# Patient Record
Sex: FEMALE | Race: WHITE | NOT HISPANIC OR LATINO | Employment: UNEMPLOYED | URBAN - METROPOLITAN AREA
[De-identification: names, ages, dates, MRNs, and addresses within clinical notes are randomized per-mention and may not be internally consistent; named-entity substitution may affect disease eponyms.]

---

## 2018-12-05 ENCOUNTER — HOSPITAL ENCOUNTER (EMERGENCY)
Facility: HOSPITAL | Age: 38
Discharge: HOME | End: 2018-12-05
Attending: EMERGENCY MEDICINE | Admitting: EMERGENCY MEDICINE

## 2018-12-05 VITALS
SYSTOLIC BLOOD PRESSURE: 131 MMHG | WEIGHT: 169.75 LBS | OXYGEN SATURATION: 99 % | TEMPERATURE: 98.1 F | DIASTOLIC BLOOD PRESSURE: 80 MMHG | RESPIRATION RATE: 18 BRPM | HEART RATE: 75 BPM

## 2018-12-05 DIAGNOSIS — M54.9 BACK PAIN: ICD-10-CM
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
## 2018-12-05 PROCEDURE — 99284 EMERGENCY DEPT VISIT MOD MDM: CPT

## 2018-12-05 RX ORDER — IBUPROFEN 400 MG/1
400 TABLET ORAL ONCE
Status: COMPLETED | OUTPATIENT
Start: 2018-12-05 | End: 2018-12-05

## 2018-12-05 RX ORDER — ALBUTEROL SULFATE 90 UG/1
2 AEROSOL, METERED RESPIRATORY (INHALATION) EVERY 6 HOURS PRN
COMMUNITY

## 2018-12-05 RX ORDER — METHOCARBAMOL 500 MG/1
500 TABLET, FILM COATED ORAL ONCE
Status: COMPLETED | OUTPATIENT
Start: 2018-12-05 | End: 2018-12-05

## 2018-12-05 RX ORDER — METHOCARBAMOL 500 MG/1
500 TABLET, FILM COATED ORAL 2 TIMES DAILY
Qty: 20 TABLET | Refills: 0 | Status: SHIPPED | OUTPATIENT
Start: 2018-12-05 | End: 2021-03-11

## 2018-12-05 RX ADMIN — METHOCARBAMOL TABLETS 500 MG: 500 TABLET, COATED ORAL at 22:27

## 2018-12-05 RX ADMIN — IBUPROFEN 400 MG: 400 TABLET ORAL at 22:27
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
## 2020-07-24 DIAGNOSIS — Z20.828 EXPOSURE TO SARS VIRUS: Primary | ICD-10-CM

## 2020-07-24 DIAGNOSIS — Z20.828 EXPOSURE TO SARS VIRUS: ICD-10-CM

## 2020-07-24 PROCEDURE — U0003 INFECTIOUS AGENT DETECTION BY NUCLEIC ACID (DNA OR RNA); SEVERE ACUTE RESPIRATORY SYNDROME CORONAVIRUS 2 (SARS-COV-2) (CORONAVIRUS DISEASE [COVID-19]), AMPLIFIED PROBE TECHNIQUE, MAKING USE OF HIGH THROUGHPUT TECHNOLOGIES AS DESCRIBED BY CMS-2020-01-R: HCPCS

## 2020-07-26 LAB — SARS-COV-2 RNA SPEC QL NAA+PROBE: NOT DETECTED

## 2020-07-27 ENCOUNTER — TELEPHONE (OUTPATIENT)
Dept: FAMILY MEDICINE CLINIC | Facility: CLINIC | Age: 40
End: 2020-07-27

## 2020-09-16 ENCOUNTER — TELEPHONE (OUTPATIENT)
Dept: OBGYN CLINIC | Facility: CLINIC | Age: 40
End: 2020-09-16

## 2020-09-16 NOTE — TELEPHONE ENCOUNTER
patient called and left a vm would like appointment with Dr Jazzy Butt, called her back, and was disconnected, rtc to both number and left vm to rtc to schedule an appointment

## 2020-09-18 ENCOUNTER — OFFICE VISIT (OUTPATIENT)
Dept: OBGYN CLINIC | Facility: CLINIC | Age: 40
End: 2020-09-18

## 2020-09-18 VITALS
HEIGHT: 63 IN | TEMPERATURE: 98.1 F | WEIGHT: 185 LBS | SYSTOLIC BLOOD PRESSURE: 104 MMHG | DIASTOLIC BLOOD PRESSURE: 60 MMHG | BODY MASS INDEX: 32.78 KG/M2

## 2020-09-18 DIAGNOSIS — B96.89 BV (BACTERIAL VAGINOSIS): Primary | ICD-10-CM

## 2020-09-18 DIAGNOSIS — N76.0 BV (BACTERIAL VAGINOSIS): Primary | ICD-10-CM

## 2020-09-18 LAB
BV WHIFF TEST VAG QL: POSITIVE
CLUE CELLS SPEC QL WET PREP: POSITIVE
SL AMB POCT WET MOUNT: ABNORMAL
T VAGINALIS VAG QL WET PREP: NEGATIVE
YEAST VAG QL WET PREP: NEGATIVE

## 2020-09-18 PROCEDURE — 87210 SMEAR WET MOUNT SALINE/INK: CPT | Performed by: OBSTETRICS & GYNECOLOGY

## 2020-09-18 PROCEDURE — 99212 OFFICE O/P EST SF 10 MIN: CPT | Performed by: OBSTETRICS & GYNECOLOGY

## 2020-09-18 RX ORDER — METRONIDAZOLE 500 MG/1
500 TABLET ORAL EVERY 12 HOURS SCHEDULED
Qty: 14 TABLET | Refills: 0 | Status: SHIPPED | OUTPATIENT
Start: 2020-09-18 | End: 2020-09-25

## 2020-09-18 NOTE — PROGRESS NOTES
Assessment/Plan:  19-year-old female presents complaining of 3 day history of vaginal discharge that odor  Exam consistent with bacterial vaginosis, confirmed on wet mount  Treat with Flagyl 500 mg twice daily for 7 days  Patient is in a 3 year monogamous relationship  Follow-up for annual visit or if not improving  Diagnoses and all orders for this visit:    BV (bacterial vaginosis)  -     POCT wet mount  -     metroNIDAZOLE (FLAGYL) 500 mg tablet; Take 1 tablet (500 mg total) by mouth every 12 (twelve) hours for 7 days          Subjective:      Patient ID: Yonny Knox is a 35 y o  female  19-year-old  4 para 1 female presents complaining of a 3 day history of vaginal discharge with an odor     Patient has longstanding infertility, had 1 tube removed through an ectopic pregnancy and was tested and her remaining tube is blocked  She denies any itching  She denies any dyspareunia  She denies any dysuria or urinary frequency  She has no fever or chills  The following portions of the patient's history were reviewed and updated as appropriate: allergies, current medications, past family history, past medical history, past social history, past surgical history and problem list     Review of Systems   Gastrointestinal: Negative for abdominal distention and abdominal pain  Genitourinary: Positive for vaginal discharge  Negative for decreased urine volume, difficulty urinating, dyspareunia, dysuria, flank pain, frequency, genital sores, hematuria, menstrual problem, pelvic pain, urgency, vaginal bleeding and vaginal pain  Vulvar and vaginal erythema  Increased vaginal discharge noted         Objective:      /60 (BP Location: Right arm, Patient Position: Sitting, Cuff Size: Adult)   Temp 98 1 °F (36 7 °C) (Tympanic)   Ht 5' 3" (1 6 m)   Wt 83 9 kg (185 lb)   LMP 2020   BMI 32 77 kg/m²          Physical Exam  Exam conducted with a chaperone present     Constitutional: Appearance: She is well-developed  She is obese  Comments: Class 1 obesity, BMI is 32 7   Abdominal:      General: There is no distension  Palpations: Abdomen is soft  There is no mass  Tenderness: There is no abdominal tenderness  Genitourinary:     General: Normal vulva  Exam position: Lithotomy position  Labia:         Right: No rash or tenderness  Left: No rash or tenderness  Vagina: Vaginal discharge and erythema present  No tenderness or bleeding  Cervix: Normal       Uterus: Normal        Adnexa: Right adnexa normal and left adnexa normal         Right: No mass, tenderness or fullness  Left: No mass, tenderness or fullness  Rectum: Normal    Lymphadenopathy:      Lower Body: No right inguinal adenopathy  No left inguinal adenopathy  Neurological:      Mental Status: She is alert

## 2020-12-18 ENCOUNTER — HOSPITAL ENCOUNTER (EMERGENCY)
Facility: HOSPITAL | Age: 40
Discharge: HOME | End: 2020-12-18
Attending: EMERGENCY MEDICINE | Admitting: EMERGENCY MEDICINE
Payer: COMMERCIAL

## 2020-12-18 ENCOUNTER — APPOINTMENT (EMERGENCY)
Dept: RADIOLOGY | Facility: HOSPITAL | Age: 40
End: 2020-12-18
Payer: COMMERCIAL

## 2020-12-18 VITALS
WEIGHT: 175 LBS | BODY MASS INDEX: 31.01 KG/M2 | HEIGHT: 63 IN | TEMPERATURE: 99.5 F | SYSTOLIC BLOOD PRESSURE: 123 MMHG | OXYGEN SATURATION: 99 % | DIASTOLIC BLOOD PRESSURE: 67 MMHG | RESPIRATION RATE: 18 BRPM | HEART RATE: 103 BPM

## 2020-12-18 DIAGNOSIS — T78.40XA ACUTE ALLERGIC REACTION, INITIAL ENCOUNTER: Primary | ICD-10-CM

## 2020-12-18 PROCEDURE — 96374 THER/PROPH/DIAG INJ IV PUSH: CPT

## 2020-12-18 PROCEDURE — 99284 EMERGENCY DEPT VISIT MOD MDM: CPT

## 2020-12-18 PROCEDURE — 96375 TX/PRO/DX INJ NEW DRUG ADDON: CPT

## 2020-12-18 PROCEDURE — 71045 X-RAY EXAM CHEST 1 VIEW: CPT

## 2020-12-18 PROCEDURE — 93005 ELECTROCARDIOGRAM TRACING: CPT

## 2020-12-18 PROCEDURE — 96372 THER/PROPH/DIAG INJ SC/IM: CPT

## 2020-12-18 PROCEDURE — 99284 EMERGENCY DEPT VISIT MOD MDM: CPT | Performed by: PHYSICIAN ASSISTANT

## 2020-12-18 RX ORDER — EPINEPHRINE 1 MG/ML
0.3 INJECTION, SOLUTION, CONCENTRATE INTRAVENOUS ONCE
Status: COMPLETED | OUTPATIENT
Start: 2020-12-18 | End: 2020-12-18

## 2020-12-18 RX ORDER — DIPHENHYDRAMINE HYDROCHLORIDE 50 MG/ML
50 INJECTION INTRAMUSCULAR; INTRAVENOUS ONCE
Status: COMPLETED | OUTPATIENT
Start: 2020-12-18 | End: 2020-12-18

## 2020-12-18 RX ORDER — PREDNISONE 20 MG/1
TABLET ORAL
Qty: 16 TABLET | Refills: 0 | Status: SHIPPED | OUTPATIENT
Start: 2020-12-18 | End: 2020-12-28

## 2020-12-18 RX ORDER — METHYLPREDNISOLONE SODIUM SUCCINATE 125 MG/2ML
125 INJECTION, POWDER, LYOPHILIZED, FOR SOLUTION INTRAMUSCULAR; INTRAVENOUS ONCE
Status: COMPLETED | OUTPATIENT
Start: 2020-12-18 | End: 2020-12-18

## 2020-12-18 RX ADMIN — DIPHENHYDRAMINE HYDROCHLORIDE 50 MG: 50 INJECTION, SOLUTION INTRAMUSCULAR; INTRAVENOUS at 12:09

## 2020-12-18 RX ADMIN — METHYLPREDNISOLONE SODIUM SUCCINATE 125 MG: 125 INJECTION, POWDER, FOR SOLUTION INTRAMUSCULAR; INTRAVENOUS at 12:06

## 2020-12-18 RX ADMIN — EPINEPHRINE 0.3 MG: 1 INJECTION, SOLUTION, CONCENTRATE INTRAVENOUS at 12:10

## 2020-12-18 RX ADMIN — FAMOTIDINE 20 MG: 10 INJECTION, SOLUTION INTRAVENOUS at 12:07

## 2020-12-20 LAB
ATRIAL RATE: 99 BPM
P AXIS: 82 DEGREES
PR INTERVAL: 139 MS
QRS AXIS: 69 DEGREES
QRSD INTERVAL: 82 MS
QT INTERVAL: 340 MS
QTC INTERVAL: 437 MS
T WAVE AXIS: 30 DEGREES
VENTRICULAR RATE: 99 BPM

## 2020-12-20 PROCEDURE — 93010 ELECTROCARDIOGRAM REPORT: CPT | Performed by: INTERNAL MEDICINE

## 2021-02-11 ENCOUNTER — NURSE TRIAGE (OUTPATIENT)
Dept: OTHER | Facility: OTHER | Age: 41
End: 2021-02-11

## 2021-02-11 DIAGNOSIS — Z20.828 SARS-ASSOCIATED CORONAVIRUS EXPOSURE: Primary | ICD-10-CM

## 2021-02-11 DIAGNOSIS — Z20.828 SARS-ASSOCIATED CORONAVIRUS EXPOSURE: ICD-10-CM

## 2021-02-11 PROCEDURE — U0005 INFEC AGEN DETEC AMPLI PROBE: HCPCS | Performed by: FAMILY MEDICINE

## 2021-02-11 PROCEDURE — U0003 INFECTIOUS AGENT DETECTION BY NUCLEIC ACID (DNA OR RNA); SEVERE ACUTE RESPIRATORY SYNDROME CORONAVIRUS 2 (SARS-COV-2) (CORONAVIRUS DISEASE [COVID-19]), AMPLIFIED PROBE TECHNIQUE, MAKING USE OF HIGH THROUGHPUT TECHNOLOGIES AS DESCRIBED BY CMS-2020-01-R: HCPCS | Performed by: FAMILY MEDICINE

## 2021-02-11 NOTE — TELEPHONE ENCOUNTER
Regarding: COVID Test Request /  Exposure   ----- Message from Hanny Armando sent at 2/11/2021 11:41 AM EST -----  "I had direct contact with someone who tested positive for covid / I currently do not have any symptoms"

## 2021-02-11 NOTE — TELEPHONE ENCOUNTER
Reason for Disposition   [1] CLOSE CONTACT COVID-19 EXPOSURE within last 14 days AND [2] needs COVID-19 lab test to return to work AND [3] NO symptoms    Answer Assessment - Initial Assessment Questions  1  COVID-19 CLOSE CONTACT: "Who is the person with the confirmed or suspected COVID-19 infection that you were exposed to?"      Pt  Was exposed to someone at a dinner party  2  PLACE of CONTACT: "Where were you when you were exposed to COVID-19?" (e g , home, school, medical waiting room; which city?)      [facility]  3  TYPE of CONTACT: "How much contact was there?" (e g , sitting next to, live in same house, work in same office, same building)      Pt  Was sitting 6-7 ft away from pt , masks were not worn during dinner  4  DURATION of CONTACT: "How long were you in contact with the COVID-19 patient?" (e g , a few seconds, passed by person, a few minutes, 15 minutes or longer, live with the patient)      6-7 ft for more than 15 minutes  5  MASK: "Were you wearing a mask?" "Was the other person wearing a mask?" Note: wearing a mask reduces the risk of an otherwise close contact  Mask was worn for part of the time  6  DATE of CONTACT: "When did you have contact with a COVID-19 patient?" (e g , how many days ago)      2/6/21  7  COMMUNITY SPREAD: "Are there lots of cases of COVID-19 (community spread) where you live?" (See public health department website, if unsure)        unsure  8  SYMPTOMS: "Do you have any symptoms?" (e g , fever, cough, breathing difficulty, loss of taste or smell)      No symptoms at this time  9  PREGNANCY OR POSTPARTUM: "Is there any chance you are pregnant?" "When was your last menstrual period?" "Did you deliver in the last 2 weeks?"      First day of her last menstrual period was 1/21/21  10  HIGH RISK: "Do you have any heart or lung problems?  Do you have a weak immune system?" (e g , heart failure, COPD, asthma, HIV positive, chemotherapy, renal failure, diabetes mellitus, sickle cell anemia, obesity)        asthma  11  TRAVEL: "Have you traveled out of the country recently?" If so, "When and where?" Also ask about out-of-state travel, since the CDC has identified some high-risk cities for community spread in the [address] [place] [address] [address]  Note: Travel becomes less relevant if there is widespread community transmission where the patient lives          No recent travel    Protocols used: CORONAVIRUS (COVID-19) EXPOSURE-ADULT-[place]

## 2021-02-12 ENCOUNTER — TELEPHONE (OUTPATIENT)
Dept: OTHER | Facility: OTHER | Age: 41
End: 2021-02-12

## 2021-02-12 LAB — SARS-COV-2 RNA RESP QL NAA+PROBE: NEGATIVE

## 2021-02-12 NOTE — TELEPHONE ENCOUNTER
Your test for COVID-19, also known as novel coronavirus, came back negative  You do not have COVID-19  If you have any additional questions, we can schedule a virtual visit for you with a provider or call the [facility] hotline [phone] Option 7 for care advice  For additional information , please visit the Coronavirus FAQ on the [address] [address]  ([employer])

## 2021-03-11 ENCOUNTER — HOSPITAL ENCOUNTER (EMERGENCY)
Facility: HOSPITAL | Age: 41
Discharge: HOME | End: 2021-03-12
Attending: EMERGENCY MEDICINE | Admitting: EMERGENCY MEDICINE
Payer: COMMERCIAL

## 2021-03-11 VITALS
OXYGEN SATURATION: 99 % | WEIGHT: 172 LBS | BODY MASS INDEX: 31.65 KG/M2 | HEIGHT: 62 IN | TEMPERATURE: 98.1 F | HEART RATE: 78 BPM | RESPIRATION RATE: 18 BRPM | SYSTOLIC BLOOD PRESSURE: 122 MMHG | DIASTOLIC BLOOD PRESSURE: 70 MMHG

## 2021-03-11 DIAGNOSIS — N12 PYELONEPHRITIS: Primary | ICD-10-CM

## 2021-03-11 PROCEDURE — 81025 URINE PREGNANCY TEST: CPT | Performed by: EMERGENCY MEDICINE

## 2021-03-11 PROCEDURE — 99284 EMERGENCY DEPT VISIT MOD MDM: CPT

## 2021-03-12 LAB
ALBUMIN SERPL BCP-MCNC: 3.8 G/DL (ref 3.4–4.8)
ALP SERPL-CCNC: 39.4 U/L (ref 35–140)
ALT SERPL W P-5'-P-CCNC: 6 U/L (ref 5–54)
ANION GAP SERPL CALCULATED.3IONS-SCNC: 7 MMOL/L (ref 4–13)
AST SERPL W P-5'-P-CCNC: 15 U/L (ref 15–41)
BACTERIA UR QL AUTO: ABNORMAL /HPF
BASOPHILS # BLD AUTO: 0.04 THOUSANDS/ΜL (ref 0–0.1)
BASOPHILS NFR BLD AUTO: 1 % (ref 0–1)
BILIRUB SERPL-MCNC: 0.36 MG/DL (ref 0.3–1.2)
BILIRUB UR QL STRIP: NEGATIVE
BUN SERPL-MCNC: 15 MG/DL (ref 6–20)
CALCIUM SERPL-MCNC: 8.9 MG/DL (ref 8.4–10.2)
CHLORIDE SERPL-SCNC: 107 MMOL/L (ref 96–108)
CLARITY UR: CLEAR
CO2 SERPL-SCNC: 24 MMOL/L (ref 22–33)
COLOR UR: YELLOW
CREAT SERPL-MCNC: 0.68 MG/DL (ref 0.4–1.1)
EOSINOPHIL # BLD AUTO: 0.18 THOUSAND/ΜL (ref 0–0.61)
EOSINOPHIL NFR BLD AUTO: 2 % (ref 0–6)
ERYTHROCYTE [DISTWIDTH] IN BLOOD BY AUTOMATED COUNT: 13.4 % (ref 11.6–15.1)
EXT PREG TEST URINE: NEGATIVE
EXT. CONTROL ED NAV: NORMAL
GFR SERPL CREATININE-BSD FRML MDRD: 109 ML/MIN/1.73SQ M
GLUCOSE SERPL-MCNC: 122 MG/DL (ref 65–140)
GLUCOSE UR STRIP-MCNC: NEGATIVE MG/DL
HCT VFR BLD AUTO: 33.7 % (ref 34.8–46.1)
HGB BLD-MCNC: 10.7 G/DL (ref 11.5–15.4)
HGB UR QL STRIP.AUTO: NEGATIVE
IMM GRANULOCYTES # BLD AUTO: 0.01 THOUSAND/UL (ref 0–0.2)
IMM GRANULOCYTES NFR BLD AUTO: 0 % (ref 0–2)
KETONES UR STRIP-MCNC: NEGATIVE MG/DL
LEUKOCYTE ESTERASE UR QL STRIP: ABNORMAL
LIPASE SERPL-CCNC: 39 U/L (ref 13–60)
LYMPHOCYTES # BLD AUTO: 2.87 THOUSANDS/ΜL (ref 0.6–4.47)
LYMPHOCYTES NFR BLD AUTO: 37 % (ref 14–44)
MCH RBC QN AUTO: 26.7 PG (ref 26.8–34.3)
MCHC RBC AUTO-ENTMCNC: 31.8 G/DL (ref 31.4–37.4)
MCV RBC AUTO: 84 FL (ref 82–98)
MONOCYTES # BLD AUTO: 0.54 THOUSAND/ΜL (ref 0.17–1.22)
MONOCYTES NFR BLD AUTO: 7 % (ref 4–12)
MUCOUS THREADS UR QL AUTO: ABNORMAL
NEUTROPHILS # BLD AUTO: 4.07 THOUSANDS/ΜL (ref 1.85–7.62)
NEUTS SEG NFR BLD AUTO: 53 % (ref 43–75)
NITRITE UR QL STRIP: NEGATIVE
NON-SQ EPI CELLS URNS QL MICRO: ABNORMAL /HPF
PH UR STRIP.AUTO: 6 [PH]
PLATELET # BLD AUTO: 441 THOUSANDS/UL (ref 149–390)
PMV BLD AUTO: 9.9 FL (ref 8.9–12.7)
POTASSIUM SERPL-SCNC: 3.8 MMOL/L (ref 3.5–5)
PROT SERPL-MCNC: 7.1 G/DL (ref 6.4–8.3)
PROT UR STRIP-MCNC: NEGATIVE MG/DL
RBC # BLD AUTO: 4.01 MILLION/UL (ref 3.81–5.12)
RBC #/AREA URNS AUTO: ABNORMAL /HPF
SODIUM SERPL-SCNC: 138 MMOL/L (ref 133–145)
SP GR UR STRIP.AUTO: >=1.03 (ref 1–1.03)
UROBILINOGEN UR QL STRIP.AUTO: 0.2 E.U./DL
WBC # BLD AUTO: 7.71 THOUSAND/UL (ref 4.31–10.16)
WBC #/AREA URNS AUTO: ABNORMAL /HPF

## 2021-03-12 PROCEDURE — 80053 COMPREHEN METABOLIC PANEL: CPT | Performed by: EMERGENCY MEDICINE

## 2021-03-12 PROCEDURE — 85025 COMPLETE CBC W/AUTO DIFF WBC: CPT | Performed by: EMERGENCY MEDICINE

## 2021-03-12 PROCEDURE — 83690 ASSAY OF LIPASE: CPT | Performed by: EMERGENCY MEDICINE

## 2021-03-12 PROCEDURE — 87186 SC STD MICRODIL/AGAR DIL: CPT | Performed by: EMERGENCY MEDICINE

## 2021-03-12 PROCEDURE — 36415 COLL VENOUS BLD VENIPUNCTURE: CPT | Performed by: EMERGENCY MEDICINE

## 2021-03-12 PROCEDURE — 87086 URINE CULTURE/COLONY COUNT: CPT | Performed by: EMERGENCY MEDICINE

## 2021-03-12 PROCEDURE — 99284 EMERGENCY DEPT VISIT MOD MDM: CPT | Performed by: EMERGENCY MEDICINE

## 2021-03-12 PROCEDURE — 87077 CULTURE AEROBIC IDENTIFY: CPT | Performed by: EMERGENCY MEDICINE

## 2021-03-12 PROCEDURE — 81001 URINALYSIS AUTO W/SCOPE: CPT | Performed by: EMERGENCY MEDICINE

## 2021-03-12 RX ORDER — IBUPROFEN 600 MG/1
600 TABLET ORAL ONCE
Status: COMPLETED | OUTPATIENT
Start: 2021-03-12 | End: 2021-03-12

## 2021-03-12 RX ORDER — CIPROFLOXACIN 500 MG/1
500 TABLET, FILM COATED ORAL ONCE
Status: COMPLETED | OUTPATIENT
Start: 2021-03-12 | End: 2021-03-12

## 2021-03-12 RX ORDER — CIPROFLOXACIN 500 MG/1
500 TABLET, FILM COATED ORAL 2 TIMES DAILY
Qty: 14 TABLET | Refills: 0 | Status: SHIPPED | OUTPATIENT
Start: 2021-03-12 | End: 2021-03-19

## 2021-03-12 RX ADMIN — IBUPROFEN 600 MG: 600 TABLET ORAL at 00:54

## 2021-03-12 RX ADMIN — CIPROFLOXACIN HYDROCHLORIDE 500 MG: 500 TABLET, FILM COATED ORAL at 00:54

## 2021-03-12 NOTE — ED NOTES
Patient given d/c instructions and left ED without any further complaints or questions     Dorita Mccormick, RN  03/12/21 0133

## 2021-03-12 NOTE — ED PROVIDER NOTES
History  Chief Complaint   Patient presents with    Flank Pain     patient states has pain in her left flank area for the past 2 days     This is a 26-year-old female presents the emergency department complaining of left flank pain  Patient states he has had left flank pain for the past 2 days  She states has been getting progressively worse  She states that is intermittent  It is cramping and sharp in nature  It is severe at times  Nothing makes it better  The patient does complain of urinary frequency  She denies dysuria  She denies urgency  Patient denies fevers or chills  She denies abdominal pain  She denies nausea or vomiting  She states this feels like a prior urinary tract infection, not like a kidney stone  Prior to Admission Medications   Prescriptions Last Dose Informant Patient Reported? Taking? albuterol (ACCUNEB) 0 63 MG/3ML nebulizer solution Past Week at Unknown time  Yes Yes   Sig: Take 1 ampule by nebulization every 6 (six) hours as needed for wheezing  albuterol (PROVENTIL HFA,VENTOLIN HFA) 90 mcg/act inhaler Past Week at Unknown time  Yes Yes   Sig: Inhale 2 puffs every 6 (six) hours as needed for wheezing      Facility-Administered Medications: None       Past Medical History:   Diagnosis Date    Asthma     Endometriosis     neg    History of mammogram 06/27/2017    History of migraine headaches     Ovarian cyst     Vaginal Pap smear 06/09/2017    neg       Past Surgical History:   Procedure Laterality Date    ECTOPIC PREGNANCY SURGERY      HYSTEROSCOPY  11/23/2010    Hysteroscopy biopsy       Family History   Problem Relation Age of Onset    Breast cancer Mother     Kidney disease Mother     Hypertension Brother     Hyperlipidemia Brother     Breast cancer Paternal Aunt      I have reviewed and agree with the history as documented      E-Cigarette/Vaping    E-Cigarette Use Never User      E-Cigarette/Vaping Substances    Nicotine No     THC No     CBD No     Flavoring No     Other No     Unknown No      Social History     Tobacco Use    Smoking status: Never Smoker    Smokeless tobacco: Never Used   Substance Use Topics    Alcohol use: Yes     Frequency: Monthly or less     Comment: occasionally-as per Devon De La Rosa Drug use: No       Review of Systems   All other systems reviewed and are negative        Physical Exam  Physical Exam  Constitutional:  Vital signs reviewed, patient appears nontoxic, no acute distress  Eyes: Pupils equal round reactive to light and accommodation, extraocular muscles intact  HEENT: trachea midline, no JVD, moist mucous membranes  Respiratory: lung sounds clear throughout, no rhonchi, no rales  Cardiovascular: regular rate rhythm, no murmurs or rubs  Abdomen: soft, nontender, nondistended, no rebound or guarding  Back:  Mild left-sided CVA tenderness, normal inspection  Extremities: no edema, pulses equal in all 4 extremities  Neuro: awake, alert, oriented, no focal weakness  Skin: warm, dry, intact, no rashes noted  Vital Signs  ED Triage Vitals [03/11/21 2348]   Temperature Pulse Respirations Blood Pressure SpO2   98 1 °F (36 7 °C) 78 18 122/70 99 %      Temp Source Heart Rate Source Patient Position - Orthostatic VS BP Location FiO2 (%)   Oral Monitor Sitting Left arm --      Pain Score       5           Vitals:    03/11/21 2348   BP: 122/70   Pulse: 78   Patient Position - Orthostatic VS: Sitting         Visual Acuity      ED Medications  Medications   ciprofloxacin (CIPRO) tablet 500 mg (500 mg Oral Given 3/12/21 0054)   ibuprofen (MOTRIN) tablet 600 mg (600 mg Oral Given 3/12/21 0054)       Diagnostic Studies  Results Reviewed     Procedure Component Value Units Date/Time    Urine Microscopic [[id number]]  (Abnormal) Collected: 03/12/21 0001    Lab Status: Final result Specimen: Urine, Clean Catch Updated: 03/12/21 0043     RBC, UA 0-5 /hpf      WBC, UA 20-30 /hpf      Epithelial Cells Moderate /hpf      Bacteria, UA Occasional /hpf      MUCUS THREADS Moderate    Urine culture [[id number]] Collected: 03/12/21 0001    Lab Status:  In process Specimen: Urine, Clean Catch Updated: 03/12/21 0043    Comprehensive metabolic panel [[id number]] Collected: 03/12/21 0001    Lab Status: Final result Specimen: Blood from Arm, Right Updated: 03/12/21 0040     Sodium 138 mmol/L      Potassium 3 8 mmol/L      Chloride 107 mmol/L      CO2 24 mmol/L      ANION GAP 7 mmol/L      BUN 15 mg/dL      Creatinine 0 68 mg/dL      Glucose 122 mg/dL      Calcium 8 9 mg/dL      AST 15 U/L      ALT 6 U/L      Alkaline Phosphatase 39 4 U/L      Total Protein 7 1 g/dL      Albumin 3 8 g/dL      Total Bilirubin 0 36 mg/dL      eGFR 109 ml/min/1 73sq m     Narrative:      [place] guidelines for Chronic Kidney Disease (CKD):     Stage 1 with normal or high GFR (GFR > 90 mL/min/1 73 square meters)    Stage 2 Mild CKD (GFR = 60-89 mL/min/1 73 square meters)    Stage 3A Moderate CKD (GFR = 45-59 mL/min/1 73 square meters)    Stage 3B Moderate CKD (GFR = 30-44 mL/min/1 73 square meters)    Stage 4 Severe CKD (GFR = 15-29 mL/min/1 73 square meters)    Stage 5 End Stage CKD (GFR <15 mL/min/1 73 square meters)  Note: GFR calculation is accurate only with a steady state creatinine    Lipase [[id number]]  (Normal) Collected: 03/12/21 0001    Lab Status: Final result Specimen: Blood from Arm, Right Updated: 03/12/21 0040     Lipase 39 u/L     CBC and differential [[id number]]  (Abnormal) Collected: 03/12/21 0001    Lab Status: Final result Specimen: Blood from Arm, Right Updated: 03/12/21 0031     WBC 7 71 Thousand/uL      RBC 4 01 Million/uL      Hemoglobin 10 7 g/dL      Hematocrit 33 7 %      MCV 84 fL      MCH 26 7 pg      MCHC 31 8 g/dL      RDW 13 4 %      MPV 9 9 fL      Platelets 237 Thousands/uL      Neutrophils Relative 53 %      Immat GRANS % 0 %      Lymphocytes Relative 37 %      Monocytes Relative 7 %      Eosinophils Relative 2 % Basophils Relative 1 %      Neutrophils Absolute 4 07 Thousands/µL      Immature Grans Absolute 0 01 Thousand/uL      Lymphocytes Absolute 2 87 Thousands/µL      Monocytes Absolute 0 54 Thousand/µL      Eosinophils Absolute 0 18 Thousand/µL      Basophils Absolute 0 04 Thousands/µL     UA w Reflex to Microscopic w Reflex to Culture [[id number]]  (Abnormal) Collected: 03/12/21 0001    Lab Status: Final result Specimen: Urine, Clean Catch Updated: 03/12/21 0023     Color, UA Yellow     Clarity, UA Clear     Specific Gravity, UA >=1 030     pH, UA 6 0     Leukocytes, UA Trace     Nitrite, UA Negative     Protein, UA Negative mg/dl      Glucose, UA Negative mg/dl      Ketones, UA Negative mg/dl      Urobilinogen, UA 0 2 E U /dl      Bilirubin, UA Negative     Blood, UA Negative    POCT pregnancy, urine [[id number]]  (Normal) Resulted: 03/12/21 0004    Lab Status: Final result Specimen: Urine Updated: 03/12/21 0004     EXT PREG TEST UR (Ref: Negative) negative     Control valid                 No orders to display              Procedures  Procedures         ED Course                                           MDM  Number of Diagnoses or Management Options  Pyelonephritis:   Diagnosis management comments: This is a 50-year-old female presented to the emergency department complaining of mild left-sided CVA tenderness  I considered urinary tract infection, pyelonephritis, kidney stone  These and other diagnoses were considered  The patient had lab work significant for a normal white count  The patient had a UA that was significant for wbc's    The patient will be started on antibiotics and discharged with follow-up to her primary care physician       Amount and/or Complexity of Data Reviewed  Clinical lab tests: reviewed and ordered        Disposition  Final diagnoses:   Pyelonephritis     Time reflects when diagnosis was documented in both MDM as applicable and the Disposition within this note     Time User Action Codes Description Comment    3/12/2021 12:59 AM Santana Jaime Add [N12] Pyelonephritis       ED Disposition     ED Disposition Condition Date/Time Comment    Discharge Stable Fri Mar 12, 2021 12:59 AM Cheyenne Perry discharge to home/self care  Follow-up Information     Follow up With Specialties Details Why Contact Umer Conley PA-C [facility], Physician Assistant   [address] [place] [address]  [phone]            Discharge Medication List as of 3/12/2021  1:00 AM      START taking these medications    Details   ciprofloxacin (CIPRO) 500 mg tablet Take 1 tablet (500 mg total) by mouth 2 (two) times a day for 7 days, Starting Fri 3/12/2021, Until Fri 3/19/2021, Normal         CONTINUE these medications which have NOT CHANGED    Details   albuterol (ACCUNEB) 0 63 MG/3ML nebulizer solution Take 1 ampule by nebulization every 6 (six) hours as needed for wheezing , Until Discontinued, Historical Med      albuterol (PROVENTIL HFA,VENTOLIN HFA) 90 mcg/act inhaler Inhale 2 puffs every 6 (six) hours as needed for wheezing, Historical Med           No discharge procedures on file      PDMP Review     None          ED Provider  Electronically Signed by           Stephanie Mcmahan DO  03/12/21 0931

## 2021-03-14 LAB — BACTERIA UR CULT: ABNORMAL

## 2021-04-06 ENCOUNTER — HOSPITAL ENCOUNTER (EMERGENCY)
Facility: HOSPITAL | Age: 41
Discharge: HOME | End: 2021-04-06
Attending: EMERGENCY MEDICINE | Admitting: EMERGENCY MEDICINE
Payer: COMMERCIAL

## 2021-04-06 ENCOUNTER — APPOINTMENT (EMERGENCY)
Dept: CT IMAGING | Facility: HOSPITAL | Age: 41
End: 2021-04-06
Payer: COMMERCIAL

## 2021-04-06 VITALS
HEIGHT: 63 IN | OXYGEN SATURATION: 100 % | SYSTOLIC BLOOD PRESSURE: 128 MMHG | BODY MASS INDEX: 31.89 KG/M2 | WEIGHT: 180 LBS | DIASTOLIC BLOOD PRESSURE: 56 MMHG | RESPIRATION RATE: 18 BRPM | TEMPERATURE: 99.5 F | HEART RATE: 92 BPM

## 2021-04-06 DIAGNOSIS — R31.9 HEMATURIA: ICD-10-CM

## 2021-04-06 DIAGNOSIS — E87.6 HYPOKALEMIA: Primary | ICD-10-CM

## 2021-04-06 DIAGNOSIS — N12 PYELONEPHRITIS: ICD-10-CM

## 2021-04-06 LAB
ALBUMIN SERPL BCP-MCNC: 4 G/DL (ref 3.4–4.8)
ALP SERPL-CCNC: 58.2 U/L (ref 35–140)
ALT SERPL W P-5'-P-CCNC: 8 U/L (ref 5–54)
ANION GAP SERPL CALCULATED.3IONS-SCNC: 8 MMOL/L (ref 4–13)
AST SERPL W P-5'-P-CCNC: 17 U/L (ref 15–41)
BACTERIA UR QL AUTO: ABNORMAL /HPF
BASOPHILS # BLD AUTO: 0.02 THOUSANDS/ΜL (ref 0–0.1)
BASOPHILS NFR BLD AUTO: 0 % (ref 0–1)
BILIRUB SERPL-MCNC: 0.52 MG/DL (ref 0.3–1.2)
BILIRUB UR QL STRIP: NEGATIVE
BUN SERPL-MCNC: 9 MG/DL (ref 6–20)
CALCIUM SERPL-MCNC: 9.1 MG/DL (ref 8.4–10.2)
CHLORIDE SERPL-SCNC: 102 MMOL/L (ref 96–108)
CLARITY UR: ABNORMAL
CO2 SERPL-SCNC: 26 MMOL/L (ref 22–33)
COLOR UR: YELLOW
CREAT SERPL-MCNC: 0.69 MG/DL (ref 0.4–1.1)
EOSINOPHIL # BLD AUTO: 0.15 THOUSAND/ΜL (ref 0–0.61)
EOSINOPHIL NFR BLD AUTO: 3 % (ref 0–6)
ERYTHROCYTE [DISTWIDTH] IN BLOOD BY AUTOMATED COUNT: 13.4 % (ref 11.6–15.1)
EXT PREG TEST URINE: NEGATIVE
EXT. CONTROL ED NAV: NORMAL
GFR SERPL CREATININE-BSD FRML MDRD: 108 ML/MIN/1.73SQ M
GLUCOSE SERPL-MCNC: 107 MG/DL (ref 65–140)
GLUCOSE UR STRIP-MCNC: NEGATIVE MG/DL
HCT VFR BLD AUTO: 36.8 % (ref 34.8–46.1)
HGB BLD-MCNC: 11.5 G/DL (ref 11.5–15.4)
HGB UR QL STRIP.AUTO: ABNORMAL
IMM GRANULOCYTES # BLD AUTO: 0.01 THOUSAND/UL (ref 0–0.2)
IMM GRANULOCYTES NFR BLD AUTO: 0 % (ref 0–2)
KETONES UR STRIP-MCNC: NEGATIVE MG/DL
LEUKOCYTE ESTERASE UR QL STRIP: NEGATIVE
LYMPHOCYTES # BLD AUTO: 1.52 THOUSANDS/ΜL (ref 0.6–4.47)
LYMPHOCYTES NFR BLD AUTO: 25 % (ref 14–44)
MCH RBC QN AUTO: 25.8 PG (ref 26.8–34.3)
MCHC RBC AUTO-ENTMCNC: 31.3 G/DL (ref 31.4–37.4)
MCV RBC AUTO: 83 FL (ref 82–98)
MONOCYTES # BLD AUTO: 0.25 THOUSAND/ΜL (ref 0.17–1.22)
MONOCYTES NFR BLD AUTO: 4 % (ref 4–12)
MUCOUS THREADS UR QL AUTO: ABNORMAL
NEUTROPHILS # BLD AUTO: 4.08 THOUSANDS/ΜL (ref 1.85–7.62)
NEUTS SEG NFR BLD AUTO: 68 % (ref 43–75)
NITRITE UR QL STRIP: NEGATIVE
NON-SQ EPI CELLS URNS QL MICRO: ABNORMAL /HPF
PH UR STRIP.AUTO: 5.5 [PH]
PLATELET # BLD AUTO: 409 THOUSANDS/UL (ref 149–390)
PMV BLD AUTO: 9.8 FL (ref 8.9–12.7)
POTASSIUM SERPL-SCNC: 3.3 MMOL/L (ref 3.5–5)
PROT SERPL-MCNC: 7.4 G/DL (ref 6.4–8.3)
PROT UR STRIP-MCNC: NEGATIVE MG/DL
RBC # BLD AUTO: 4.46 MILLION/UL (ref 3.81–5.12)
RBC #/AREA URNS AUTO: ABNORMAL /HPF
SODIUM SERPL-SCNC: 136 MMOL/L (ref 133–145)
SP GR UR STRIP.AUTO: 1.02 (ref 1–1.03)
UROBILINOGEN UR QL STRIP.AUTO: 0.2 E.U./DL
WBC # BLD AUTO: 6.03 THOUSAND/UL (ref 4.31–10.16)
WBC #/AREA URNS AUTO: ABNORMAL /HPF

## 2021-04-06 PROCEDURE — 85025 COMPLETE CBC W/AUTO DIFF WBC: CPT | Performed by: EMERGENCY MEDICINE

## 2021-04-06 PROCEDURE — 36415 COLL VENOUS BLD VENIPUNCTURE: CPT | Performed by: EMERGENCY MEDICINE

## 2021-04-06 PROCEDURE — 99284 EMERGENCY DEPT VISIT MOD MDM: CPT | Performed by: EMERGENCY MEDICINE

## 2021-04-06 PROCEDURE — 96374 THER/PROPH/DIAG INJ IV PUSH: CPT

## 2021-04-06 PROCEDURE — 99284 EMERGENCY DEPT VISIT MOD MDM: CPT

## 2021-04-06 PROCEDURE — 74176 CT ABD & PELVIS W/O CONTRAST: CPT

## 2021-04-06 PROCEDURE — 81001 URINALYSIS AUTO W/SCOPE: CPT | Performed by: EMERGENCY MEDICINE

## 2021-04-06 PROCEDURE — 96361 HYDRATE IV INFUSION ADD-ON: CPT

## 2021-04-06 PROCEDURE — 81025 URINE PREGNANCY TEST: CPT | Performed by: EMERGENCY MEDICINE

## 2021-04-06 PROCEDURE — 80053 COMPREHEN METABOLIC PANEL: CPT | Performed by: EMERGENCY MEDICINE

## 2021-04-06 PROCEDURE — 81003 URINALYSIS AUTO W/O SCOPE: CPT | Performed by: EMERGENCY MEDICINE

## 2021-04-06 PROCEDURE — 87040 BLOOD CULTURE FOR BACTERIA: CPT | Performed by: EMERGENCY MEDICINE

## 2021-04-06 RX ORDER — KETOROLAC TROMETHAMINE 30 MG/ML
30 INJECTION, SOLUTION INTRAMUSCULAR; INTRAVENOUS ONCE
Status: COMPLETED | OUTPATIENT
Start: 2021-04-06 | End: 2021-04-06

## 2021-04-06 RX ORDER — DOXYCYCLINE HYCLATE 100 MG/1
100 CAPSULE ORAL ONCE
Status: COMPLETED | OUTPATIENT
Start: 2021-04-06 | End: 2021-04-06

## 2021-04-06 RX ORDER — DOXYCYCLINE HYCLATE 100 MG/1
100 CAPSULE ORAL 2 TIMES DAILY
Qty: 20 CAPSULE | Refills: 0 | Status: SHIPPED | OUTPATIENT
Start: 2021-04-06 | End: 2021-04-16

## 2021-04-06 RX ORDER — POTASSIUM CHLORIDE 20 MEQ/1
40 TABLET, EXTENDED RELEASE ORAL ONCE
Status: COMPLETED | OUTPATIENT
Start: 2021-04-06 | End: 2021-04-06

## 2021-04-06 RX ORDER — IBUPROFEN 600 MG/1
600 TABLET ORAL EVERY 6 HOURS PRN
Qty: 60 TABLET | Refills: 0 | Status: SHIPPED | OUTPATIENT
Start: 2021-04-06

## 2021-04-06 RX ADMIN — DOXYCYCLINE 100 MG: 100 CAPSULE ORAL at 22:32

## 2021-04-06 RX ADMIN — SODIUM CHLORIDE 1000 ML: 0.9 INJECTION, SOLUTION INTRAVENOUS at 21:30

## 2021-04-06 RX ADMIN — POTASSIUM CHLORIDE 40 MEQ: 1500 TABLET, EXTENDED RELEASE ORAL at 22:32

## 2021-04-06 RX ADMIN — KETOROLAC TROMETHAMINE 30 MG: 30 INJECTION, SOLUTION INTRAMUSCULAR at 21:40

## 2021-04-07 NOTE — DISCHARGE INSTRUCTIONS
There was incidental findings on the CT scan today - the interpretation by the radiologist is as follows: No evidence of stone or hydronephrosis  Indeterminant low-attenuation focus in the left hepatic lobe, potentially representing focal fatty infiltration or hemangioma  Follow-up nonemergent abdominal ultrasound recommended for further evaluation    The study was marked in EPIC for significant notification have referred you to a local urolost - if you continue to have pain or hematuria please call and make an appointment

## 2021-04-07 NOTE — ED TRIAGE NOTES
Pt here 2 weeks ago for kidney infection  Pt here today for increased urination, and blood in her urine that started yesterday  Pt also c/o abdominal cramping and lower back pain  Pt was rx cipro, finished about a week ago

## 2021-04-07 NOTE — ED PROVIDER NOTES
History  Chief Complaint   Patient presents with    Blood in Urine    Urinary Frequency     This is a 38year old female with a PMH of renal calculi and a pyelonephritis diagnosed on March 11 and treated with Cipro that presents to the ED reporting continued left flank pain and has developed hematuria within the past day - denies fever or chills  She reports that she took the entire RX of Cipro - I did review the sensitivity and she had bacteria that was sensitive to Cipro    She reports that she has a hx of renal calculi    Denies fever or chiils      No aggravating or alleviating factors          Prior to Admission Medications   Prescriptions Last Dose Informant Patient Reported? Taking? Cholecalciferol (VITAMIN D3 PO)   Yes Yes   Sig: Take 50,000 Units by mouth   albuterol (ACCUNEB) 0 63 MG/3ML nebulizer solution   Yes Yes   Sig: Take 1 ampule by nebulization every 6 (six) hours as needed for wheezing  albuterol (PROVENTIL HFA,VENTOLIN HFA) 90 mcg/act inhaler   Yes Yes   Sig: Inhale 2 puffs every 6 (six) hours as needed for wheezing      Facility-Administered Medications: None       Past Medical History:   Diagnosis Date    Asthma     Endometriosis     neg    History of mammogram 06/27/2017    History of migraine headaches     Ovarian cyst     Vaginal Pap smear 06/09/2017    neg       Past Surgical History:   Procedure Laterality Date    ECTOPIC PREGNANCY SURGERY      HYSTEROSCOPY  11/23/2010    Hysteroscopy biopsy       Family History   Problem Relation Age of Onset    Breast cancer Mother     Kidney disease Mother     Hypertension Brother     Hyperlipidemia Brother     Breast cancer Paternal Aunt      I have reviewed and agree with the history as documented      E-Cigarette/Vaping    E-Cigarette Use Never User      E-Cigarette/Vaping Substances    Nicotine No     THC No     CBD No     Flavoring No     Other No     Unknown No      Social History     Tobacco Use    Smoking status: Never Smoker    Smokeless tobacco: Never Used   Substance Use Topics    Alcohol use: Yes     Frequency: Monthly or less     Comment: occasionally-as per Drew Bird Drug use: No       Review of Systems   Constitutional: Negative for activity change, appetite change, chills, diaphoresis, fatigue, fever and unexpected weight change  HENT: Negative for congestion, ear discharge, ear pain, mouth sores, sinus pressure, sinus pain, sneezing, sore throat, trouble swallowing and voice change  Eyes: Negative for photophobia, pain, discharge, redness, itching and visual disturbance  Respiratory: Negative for cough, chest tightness and shortness of breath  Cardiovascular: Negative for chest pain, palpitations and leg swelling  Gastrointestinal: Negative for abdominal pain, constipation, nausea and vomiting  Endocrine: Negative for cold intolerance, heat intolerance, polydipsia, polyphagia and polyuria  Genitourinary: Positive for dysuria, flank pain (left) and hematuria  Negative for decreased urine volume, difficulty urinating, frequency and urgency  Musculoskeletal: Negative for arthralgias, back pain, gait problem, joint swelling, myalgias, neck pain and neck stiffness  Skin: Negative for color change and rash  Allergic/Immunologic: Negative for immunocompromised state  Neurological: Negative for dizziness, tremors, seizures, syncope, speech difficulty, weakness, light-headedness, numbness and headaches  Hematological: Does not bruise/bleed easily  Psychiatric/Behavioral: Negative for behavioral problems and suicidal ideas  Physical Exam  Physical Exam  Vitals signs and nursing note reviewed  Constitutional:       General: She is not in acute distress  Appearance: Normal appearance  She is well-developed and normal weight  She is not ill-appearing, toxic-appearing or diaphoretic  HENT:      Head: Normocephalic and atraumatic        Right Ear: Tympanic membrane, ear canal and external ear normal  There is no impacted cerumen  Left Ear: Tympanic membrane, ear canal and external ear normal  There is no impacted cerumen  Nose: Nose normal  No congestion or rhinorrhea  Mouth/Throat:      Pharynx: No oropharyngeal exudate or posterior oropharyngeal erythema  Eyes:      General: No scleral icterus  Right eye: No discharge  Left eye: No discharge  Extraocular Movements: Extraocular movements intact  Conjunctiva/sclera: Conjunctivae normal       Pupils: Pupils are equal, round, and reactive to light  Neck:      Musculoskeletal: Normal range of motion and neck supple  No neck rigidity or muscular tenderness  Thyroid: No thyromegaly  Vascular: No hepatojugular reflux or JVD  Trachea: No tracheal deviation  Cardiovascular:      Rate and Rhythm: Normal rate and regular rhythm  Pulses: Normal pulses  Carotid pulses are 2+ on the right side and 2+ on the left side  Radial pulses are 2+ on the right side and 2+ on the left side  Dorsalis pedis pulses are 2+ on the right side and 2+ on the left side  Posterior tibial pulses are 2+ on the right side and 2+ on the left side  Heart sounds: Normal heart sounds  No murmur  Pulmonary:      Effort: Pulmonary effort is normal  No accessory muscle usage or respiratory distress  Breath sounds: Normal breath sounds  No wheezing or rales  Chest:      Chest wall: No mass, deformity, tenderness, crepitus or edema  Abdominal:      General: Bowel sounds are normal  There is no distension or abdominal bruit  Palpations: Abdomen is soft  There is no hepatomegaly, splenomegaly or mass  Tenderness: There is no abdominal tenderness  There is left CVA tenderness  There is no right CVA tenderness, guarding or rebound  Hernia: No hernia is present  Musculoskeletal: Normal range of motion  General: No tenderness  Right lower leg:  She exhibits no tenderness  No edema  Left lower leg: She exhibits no tenderness  No edema  Lymphadenopathy:      Cervical: No cervical adenopathy  Skin:     General: Skin is warm and dry  Capillary Refill: Capillary refill takes less than 2 seconds  Findings: No ecchymosis or rash  Neurological:      General: No focal deficit present  Mental Status: She is alert and oriented to person, place, and time  Cranial Nerves: No cranial nerve deficit  Sensory: No sensory deficit  Motor: No weakness or abnormal muscle tone  Deep Tendon Reflexes: Reflexes normal    Psychiatric:         Mood and Affect: Mood normal          Behavior: Behavior normal          Thought Content: Thought content normal          Judgment: Judgment normal          Vital Signs  ED Triage Vitals   Temperature Pulse Respirations Blood Pressure SpO2   04/06/21 2056 04/06/21 2056 04/06/21 2056 04/06/21 2056 04/06/21 2056   99 5 °F (37 5 °C) 92 18 128/56 100 %      Temp src Heart Rate Source Patient Position - Orthostatic VS BP Location FiO2 (%)   -- -- -- -- --             Pain Score       04/06/21 2140       6           Vitals:    04/06/21 2056   BP: 128/56   Pulse: 92         Visual Acuity      ED Medications  Medications   sodium chloride 0 9 % bolus 1,000 mL (0 mL Intravenous Stopped 4/6/21 2237)   ketorolac (TORADOL) injection 30 mg (30 mg Intravenous Given 4/6/21 2140)   potassium chloride (K-DUR,KLOR-CON) CR tablet 40 mEq (40 mEq Oral Given 4/6/21 2232)   doxycycline hyclate (VIBRAMYCIN) capsule 100 mg (100 mg Oral Given 4/6/21 2232)       Diagnostic Studies  Results Reviewed     Procedure Component Value Units Date/Time    Blood culture #2 [[id number]] Collected: 04/06/21 2123    Lab Status: Preliminary result Specimen: Blood from Arm, Left Updated: 04/07/21 1301     Blood Culture Received in Microbiology Lab  Culture in Progress      Urine Microscopic [[id number]]  (Abnormal) Collected: 04/06/21 2131    Lab Status: Final result Specimen: Urine, Clean Catch Updated: 04/06/21 2212     RBC, UA 0-1 /hpf      WBC, UA None Seen /hpf      Epithelial Cells Occasional /hpf      Bacteria, UA Moderate /hpf      MUCUS THREADS Occasional    Comprehensive metabolic panel [[id number]]  (Abnormal) Collected: 04/06/21 2123    Lab Status: Final result Specimen: Blood from Arm, Left Updated: 04/06/21 2148     Sodium 136 mmol/L      Potassium 3 3 mmol/L      Chloride 102 mmol/L      CO2 26 mmol/L      ANION GAP 8 mmol/L      BUN 9 mg/dL      Creatinine 0 69 mg/dL      Glucose 107 mg/dL      Calcium 9 1 mg/dL      AST 17 U/L      ALT 8 U/L      Alkaline Phosphatase 58 2 U/L      Total Protein 7 4 g/dL      Albumin 4 0 g/dL      Total Bilirubin 0 52 mg/dL      eGFR 108 ml/min/1 73sq m     Narrative:      [place] guidelines for Chronic Kidney Disease (CKD):     Stage 1 with normal or high GFR (GFR > 90 mL/min/1 73 square meters)    Stage 2 Mild CKD (GFR = 60-89 mL/min/1 73 square meters)    Stage 3A Moderate CKD (GFR = 45-59 mL/min/1 73 square meters)    Stage 3B Moderate CKD (GFR = 30-44 mL/min/1 73 square meters)    Stage 4 Severe CKD (GFR = 15-29 mL/min/1 73 square meters)    Stage 5 End Stage CKD (GFR <15 mL/min/1 73 square meters)  Note: GFR calculation is accurate only with a steady state creatinine    UA w Reflex to Microscopic w Reflex to Culture [[id number]]  (Abnormal) Collected: 04/06/21 2131    Lab Status: Final result Specimen: Urine, Clean Catch Updated: 04/06/21 2144     Color, UA Yellow     Clarity, UA Slightly Cloudy     Specific Gravity, UA 1 025     pH, UA 5 5     Leukocytes, UA Negative     Nitrite, UA Negative     Protein, UA Negative mg/dl      Glucose, UA Negative mg/dl      Ketones, UA Negative mg/dl      Urobilinogen, UA 0 2 E U /dl      Bilirubin, UA Negative     Blood, UA 1+    POCT pregnancy, urine [[id number]]  (Normal) Resulted: 04/06/21 2137    Lab Status: Final result Updated: 04/06/21 2138     EXT PREG TEST UR (Ref: Negative) Negative     Control Valid    CBC and differential [[id number]]  (Abnormal) Collected: 04/06/21 2123    Lab Status: Final result Specimen: Blood from Arm, Left Updated: 04/06/21 2135     WBC 6 03 Thousand/uL      RBC 4 46 Million/uL      Hemoglobin 11 5 g/dL      Hematocrit 36 8 %      MCV 83 fL      MCH 25 8 pg      MCHC 31 3 g/dL      RDW 13 4 %      MPV 9 8 fL      Platelets 570 Thousands/uL      Neutrophils Relative 68 %      Immat GRANS % 0 %      Lymphocytes Relative 25 %      Monocytes Relative 4 %      Eosinophils Relative 3 %      Basophils Relative 0 %      Neutrophils Absolute 4 08 Thousands/µL      Immature Grans Absolute 0 01 Thousand/uL      Lymphocytes Absolute 1 52 Thousands/µL      Monocytes Absolute 0 25 Thousand/µL      Eosinophils Absolute 0 15 Thousand/µL      Basophils Absolute 0 02 Thousands/µL     Blood culture #1 [[id number]]     Lab Status: No result Specimen: Blood                  CT renal stone study abdomen pelvis wo contrast   Final Result by Bee Khalil MD (04/06 2217)      No evidence of stone or hydronephrosis  Indeterminant low-attenuation focus in the left hepatic lobe, potentially representing focal fatty infiltration or hemangioma  Follow-up nonemergent abdominal ultrasound recommended for further evaluation  The study was marked in EPIC for significant notification  Workstation performed: [id number]                    Procedures  Procedures         ED Course  ED Course as of Apr 08 0100   Thu Apr 08, 2021   267 This 78-year-old female presents emergency department with hematuria  9296 She has a past medical history of renal calculi  She was diagnosed with pyelonephritis on March 11th and treated with Cipro  I did review the antibiotic sensitivity to the organisms growing in her urine and it is sensitive to Cipro  Patient reports that she continues to have left flank pain    She denies fever chills  I ordered a pregnancy test which was negative  Urine did show moderate bacteria and I did give her Rocephin IV for the UTI  CMP was normal with exception of slightly low potassium at 3 3  Supplemented with 40 mEq of oral potassium  Renal function normal the BUN 9 creatinine 0 69  Liver function normal   Blood cultures were drawn and are pending  There was a finding on the      0059 This CT is interpreted as follows:    CT renal stone study abdomen pelvis wo contrast  Final Result by Deyvi Reis MD (04/06 2217)    No evidence of stone or hydronephrosis  Indeterminant low-attenuation focus in the left hepatic lobe, potentially representing focal fatty infiltration or hemangioma  Follow-up nonemergent abdominal ultrasound recommended for further evaluation  The study was marked in EPIC for significant notification  Workstation performed: [id number]      Discussed this with patient need to follow up for outpatient testing  Patient verbalized understanding  Patient was reexamined at this time and informed of laboratory and/or imaging results and was found to be stable for discharge  Return to emergency department criteria was reviewed with the patient who verbalized understanding and was agreeable to discharge and the treatment plan at this time  0100 Portions of the record may have been created with voice recognition software  Occasional wrong word or "sound a like" substitutions may have occurred due to the inherent limitations of voice recognition software  Read the chart carefully and recognize, using context, where substitutions have occurred                                                   MDM  Number of Diagnoses or Management Options  Hematuria: new and requires workup  Hypokalemia: minor  Pyelonephritis: established and worsening  Diagnosis management comments: Renal calculi, hydronephrosis, hydrpureter, pyelo, kidney mass or infarction       Amount and/or Complexity of Data Reviewed  Clinical lab tests: ordered and reviewed  Tests in the radiology section of CPT®: ordered and reviewed  Review and summarize past medical records: yes    Risk of Complications, Morbidity, and/or Mortality  Presenting problems: high  Diagnostic procedures: moderate  Management options: moderate    Patient Progress  Patient progress: improved      Disposition  Final diagnoses:   Hypokalemia   Hematuria   Pyelonephritis     Time reflects when diagnosis was documented in both MDM as applicable and the Disposition within this note     Time User Action Codes Description Comment    4/6/2021 10:04 PM Jhon Ascencio Add [E87 6] Hypokalemia     4/6/2021 10:26 PM Jhon Ascencio Add [R31 9] Hematuria     4/6/2021 10:26 PM Jhon Ascencio Add [N12] Pyelonephritis       ED Disposition     ED Disposition Condition Date/Time Comment    Discharge Stable Tue Apr 6, 2021 10:26 PM Cheyenne Amador discharge to home/self care              Follow-up Information     Follow up With Specialties Details Why 12 Frederick King MD Internal Medicine Schedule an appointment as soon as possible for a visit in 3 days  [address]  [phone]      Bharat Singh MD Urology   [address] [place] [address] [place]  [phone]            Discharge Medication List as of 4/6/2021 10:33 PM      START taking these medications    Details   doxycycline hyclate (VIBRAMYCIN) 100 mg capsule Take 1 capsule (100 mg total) by mouth 2 (two) times a day for 10 days, Starting Tue 4/6/2021, Until Fri 4/16/2021, Normal      ibuprofen (MOTRIN) 600 mg tablet Take 1 tablet (600 mg total) by mouth every 6 (six) hours as needed for mild pain or moderate pain, Starting Tue 4/6/2021, Normal         CONTINUE these medications which have NOT CHANGED    Details   albuterol (ACCUNEB) 0 63 MG/3ML nebulizer solution Take 1 ampule by nebulization every 6 (six) hours as needed for wheezing , Until Discontinued, Historical Med      albuterol (PROVENTIL HFA,VENTOLIN HFA) 90 mcg/act inhaler Inhale 2 puffs every 6 (six) hours as needed for wheezing, Historical Med      Cholecalciferol (VITAMIN D3 PO) Take 50,000 Units by mouth, Historical Med           No discharge procedures on file      PDMP Review     None          ED Provider  Electronically Signed by           Shantal Hanson MD  04/08/21 6021

## 2021-04-12 LAB — BACTERIA BLD CULT: NORMAL

## 2021-05-13 ENCOUNTER — OFFICE VISIT (OUTPATIENT)
Dept: OBGYN CLINIC | Facility: CLINIC | Age: 41
End: 2021-05-13
Payer: COMMERCIAL

## 2021-05-13 VITALS
WEIGHT: 177.6 LBS | DIASTOLIC BLOOD PRESSURE: 74 MMHG | BODY MASS INDEX: 31.47 KG/M2 | SYSTOLIC BLOOD PRESSURE: 118 MMHG | HEIGHT: 63 IN

## 2021-05-13 DIAGNOSIS — Z01.419 ENCOUNTER FOR GYNECOLOGICAL EXAMINATION WITHOUT ABNORMAL FINDING: Primary | ICD-10-CM

## 2021-05-13 DIAGNOSIS — Z12.31 ENCOUNTER FOR SCREENING MAMMOGRAM FOR BREAST CANCER: ICD-10-CM

## 2021-05-13 PROCEDURE — 87624 HPV HI-RISK TYP POOLED RSLT: CPT | Performed by: PHYSICIAN ASSISTANT

## 2021-05-13 PROCEDURE — 99396 PREV VISIT EST AGE 40-64: CPT | Performed by: PHYSICIAN ASSISTANT

## 2021-05-13 PROCEDURE — G0145 SCR C/V CYTO,THINLAYER,RESCR: HCPCS | Performed by: PHYSICIAN ASSISTANT

## 2021-05-13 NOTE — PATIENT INSTRUCTIONS
Mammogram due March 2022  Avoid scented body wash and laundry detergent  Do not use vaginal douches or washes  Can try daily probiotic if needed  Practice consistent condom use for STD prevention

## 2021-05-13 NOTE — PROGRESS NOTES
Assessment/Plan:    No problem-specific Assessment & Plan notes found for this encounter  Diagnoses and all orders for this visit:    Encounter for gynecological examination without abnormal finding  -     Liquid-based pap, screening    Encounter for screening mammogram for breast cancer  -     Mammo screening bilateral w 3d & cad; Future        Pap done  Order for mammogram for 2022 entered  Discussed recurrent BV with patient  May have been due to ongoing urinary issues and abx use  Recommended avoiding scented body wash and laundry detergent; do not use vaginal douches or washes  Can try daily probiotic if BV returns  Stressed consistent condom use for STD prevention  If no problems, patient to return in 1 year for routine gyn care  Subjective:      Patient ID: Oralia Luna is a 38 y o  female  Patient is here for yearly gyn exam   States she is doing well overall  Periods are regular every 28-30 days, and bleeding lasts for 5 days  Notes periods have become heavier and with more cramping over the last year  She had LSO due to ectopic pregnancy; R Fallopian tube is blocked  She is not currently sexually active  Patient states she has had 3 BV infections since September 2020; all treated with Flagyl  Has not had symptoms since February  Followed by urology for recurrent UTI/pyelonephritis  Currently stable  Patient denies bowel changes, pelvic pain, bloating, abdominal pain, n/v, change in appetite, and thyroid disease  Mammogram in March 2021 was negative  She is performing self-breast exam   Denies new masses, skin changes, nipple discharge, and pain/tenderness  Had genetic testing several years ago secondary to family history; results were negative        The following portions of the patient's history were reviewed and updated as appropriate: allergies, current medications, past family history, past medical history, past social history, past surgical history and problem list     Review of Systems   Constitutional: Negative for appetite change and unexpected weight change  Cardiovascular:        No masses, skin changes, nipple discharge, and pain/tenderness  Gastrointestinal: Negative for abdominal distention, abdominal pain, constipation, diarrhea, nausea and vomiting  Genitourinary: Negative for difficulty urinating, dysuria, frequency, genital sores, hematuria, menstrual problem, pelvic pain, urgency, vaginal bleeding, vaginal discharge and vaginal pain  Objective:      /74 (BP Location: Left arm, Patient Position: Sitting, Cuff Size: Standard)   Ht 5' 3" (1 6 m)   Wt 80 6 kg (177 lb 9 6 oz)   LMP 05/07/2021   BMI 31 46 kg/m²          Physical Exam  Vitals signs reviewed  Exam conducted with a chaperone present  Constitutional:       Appearance: Normal appearance  She is well-developed  Neck:      Musculoskeletal: Neck supple  Thyroid: No thyromegaly  Pulmonary:      Effort: Pulmonary effort is normal    Chest:      Breasts: Breasts are symmetrical          Right: Normal  No swelling, bleeding, inverted nipple, mass, nipple discharge, skin change or tenderness  Left: Normal  No swelling, bleeding, inverted nipple, mass, nipple discharge, skin change or tenderness  Abdominal:      General: Abdomen is flat  There is no distension  Palpations: Abdomen is soft  Tenderness: There is no abdominal tenderness  Genitourinary:     General: Normal vulva  Pubic Area: No rash  Labia:         Right: No rash, tenderness, lesion or injury  Left: No rash, tenderness, lesion or injury  Vagina: Normal  No vaginal discharge, erythema, tenderness or bleeding  Cervix: Normal       Uterus: Normal        Adnexa: Right adnexa normal         Right: No mass, tenderness or fullness  Comments: Left ovary and tube surgically absent  Lymphadenopathy:      Cervical: No cervical adenopathy        Upper Body:      Right upper body: No supraclavicular or axillary adenopathy  Left upper body: No supraclavicular or axillary adenopathy  Lower Body: No right inguinal adenopathy  No left inguinal adenopathy  Skin:     General: Skin is warm and dry  Neurological:      Mental Status: She is alert and oriented to person, place, and time  Psychiatric:         Mood and Affect: Mood normal          Behavior: Behavior normal  Behavior is cooperative  Thought Content:  Thought content normal          Judgment: Judgment normal

## 2021-05-15 LAB
HPV HR 12 DNA CVX QL NAA+PROBE: NEGATIVE
HPV16 DNA CVX QL NAA+PROBE: NEGATIVE
HPV18 DNA CVX QL NAA+PROBE: NEGATIVE

## 2021-05-19 LAB
LAB AP GYN PRIMARY INTERPRETATION: NORMAL
Lab: NORMAL

## 2021-12-10 ENCOUNTER — OFFICE VISIT (OUTPATIENT)
Dept: OBGYN CLINIC | Facility: CLINIC | Age: 41
End: 2021-12-10
Payer: COMMERCIAL

## 2021-12-10 VITALS
SYSTOLIC BLOOD PRESSURE: 120 MMHG | WEIGHT: 185.2 LBS | HEIGHT: 63 IN | DIASTOLIC BLOOD PRESSURE: 82 MMHG | BODY MASS INDEX: 32.82 KG/M2

## 2021-12-10 DIAGNOSIS — N76.0 ACUTE VAGINITIS: Primary | ICD-10-CM

## 2021-12-10 PROCEDURE — 87510 GARDNER VAG DNA DIR PROBE: CPT | Performed by: PHYSICIAN ASSISTANT

## 2021-12-10 PROCEDURE — 87480 CANDIDA DNA DIR PROBE: CPT | Performed by: PHYSICIAN ASSISTANT

## 2021-12-10 PROCEDURE — 99213 OFFICE O/P EST LOW 20 MIN: CPT | Performed by: PHYSICIAN ASSISTANT

## 2021-12-10 PROCEDURE — 87660 TRICHOMONAS VAGIN DIR PROBE: CPT | Performed by: PHYSICIAN ASSISTANT

## 2021-12-12 LAB
CANDIDA RRNA VAG QL PROBE: NEGATIVE
G VAGINALIS RRNA GENITAL QL PROBE: POSITIVE
T VAGINALIS RRNA GENITAL QL PROBE: NEGATIVE

## 2021-12-13 ENCOUNTER — TELEPHONE (OUTPATIENT)
Dept: OBGYN CLINIC | Facility: CLINIC | Age: 41
End: 2021-12-13

## 2021-12-13 DIAGNOSIS — N76.0 BV (BACTERIAL VAGINOSIS): Primary | ICD-10-CM

## 2021-12-13 DIAGNOSIS — B96.89 BV (BACTERIAL VAGINOSIS): Primary | ICD-10-CM

## 2021-12-13 RX ORDER — METRONIDAZOLE 500 MG/1
500 TABLET ORAL EVERY 12 HOURS SCHEDULED
Qty: 14 TABLET | Refills: 0 | Status: SHIPPED | OUTPATIENT
Start: 2021-12-13 | End: 2021-12-20

## 2022-04-12 ENCOUNTER — APPOINTMENT (OUTPATIENT)
Dept: LAB | Facility: HOSPITAL | Age: 42
End: 2022-04-12
Payer: COMMERCIAL

## 2022-04-12 DIAGNOSIS — Z13.9 SCREENING FOR UNSPECIFIED CONDITION: ICD-10-CM

## 2022-04-12 LAB
25(OH)D3 SERPL-MCNC: 31.4 NG/ML (ref 30–100)
BASOPHILS # BLD AUTO: 0.04 THOUSANDS/ΜL (ref 0–0.1)
BASOPHILS NFR BLD AUTO: 1 % (ref 0–1)
CHOLEST SERPL-MCNC: 171 MG/DL
EOSINOPHIL # BLD AUTO: 0.12 THOUSAND/ΜL (ref 0–0.61)
EOSINOPHIL NFR BLD AUTO: 3 % (ref 0–6)
ERYTHROCYTE [DISTWIDTH] IN BLOOD BY AUTOMATED COUNT: 12.9 % (ref 11.6–15.1)
HCT VFR BLD AUTO: 39.1 % (ref 34.8–46.1)
HCV AB SER QL: NORMAL
HDLC SERPL-MCNC: 60 MG/DL
HGB BLD-MCNC: 12.4 G/DL (ref 11.5–15.4)
IMM GRANULOCYTES # BLD AUTO: 0.01 THOUSAND/UL (ref 0–0.2)
IMM GRANULOCYTES NFR BLD AUTO: 0 % (ref 0–2)
LDLC SERPL CALC-MCNC: 92 MG/DL (ref 0–100)
LDLC SERPL DIRECT ASSAY-MCNC: 92 MG/DL (ref 0–100)
LYMPHOCYTES # BLD AUTO: 1.76 THOUSANDS/ΜL (ref 0.6–4.47)
LYMPHOCYTES NFR BLD AUTO: 38 % (ref 14–44)
MCH RBC QN AUTO: 28.1 PG (ref 26.8–34.3)
MCHC RBC AUTO-ENTMCNC: 31.7 G/DL (ref 31.4–37.4)
MCV RBC AUTO: 89 FL (ref 82–98)
MONOCYTES # BLD AUTO: 0.32 THOUSAND/ΜL (ref 0.17–1.22)
MONOCYTES NFR BLD AUTO: 7 % (ref 4–12)
NEUTROPHILS # BLD AUTO: 2.4 THOUSANDS/ΜL (ref 1.85–7.62)
NEUTS SEG NFR BLD AUTO: 51 % (ref 43–75)
NONHDLC SERPL-MCNC: 111 MG/DL
NRBC BLD AUTO-RTO: 0 /100 WBCS
PLATELET # BLD AUTO: 388 THOUSANDS/UL (ref 149–390)
PMV BLD AUTO: 9.5 FL (ref 8.9–12.7)
RBC # BLD AUTO: 4.42 MILLION/UL (ref 3.81–5.12)
TRIGL SERPL-MCNC: 95 MG/DL
TSH SERPL DL<=0.05 MIU/L-ACNC: 2.57 UIU/ML (ref 0.45–4.5)
WBC # BLD AUTO: 4.65 THOUSAND/UL (ref 4.31–10.16)

## 2022-04-12 PROCEDURE — 80061 LIPID PANEL: CPT

## 2022-04-12 PROCEDURE — 83721 ASSAY OF BLOOD LIPOPROTEIN: CPT

## 2022-04-12 PROCEDURE — 36415 COLL VENOUS BLD VENIPUNCTURE: CPT

## 2022-04-12 PROCEDURE — 84443 ASSAY THYROID STIM HORMONE: CPT

## 2022-04-12 PROCEDURE — 82306 VITAMIN D 25 HYDROXY: CPT

## 2022-04-12 PROCEDURE — 86803 HEPATITIS C AB TEST: CPT

## 2022-04-12 PROCEDURE — 85025 COMPLETE CBC W/AUTO DIFF WBC: CPT

## 2022-04-19 ENCOUNTER — HOSPITAL ENCOUNTER (OUTPATIENT)
Dept: RADIOLOGY | Facility: HOSPITAL | Age: 42
Discharge: HOME | End: 2022-04-19
Payer: COMMERCIAL

## 2022-04-19 VITALS — HEIGHT: 63 IN | WEIGHT: 185 LBS | BODY MASS INDEX: 32.78 KG/M2

## 2022-04-19 DIAGNOSIS — Z12.31 ENCOUNTER FOR SCREENING MAMMOGRAM FOR BREAST CANCER: ICD-10-CM

## 2022-04-19 PROCEDURE — 77067 SCR MAMMO BI INCL CAD: CPT

## 2022-04-19 PROCEDURE — 77063 BREAST TOMOSYNTHESIS BI: CPT

## 2022-05-16 ENCOUNTER — ANNUAL EXAM (OUTPATIENT)
Dept: OBGYN CLINIC | Facility: CLINIC | Age: 42
End: 2022-05-16
Payer: COMMERCIAL

## 2022-05-16 VITALS
HEIGHT: 63 IN | WEIGHT: 188 LBS | BODY MASS INDEX: 33.31 KG/M2 | DIASTOLIC BLOOD PRESSURE: 76 MMHG | SYSTOLIC BLOOD PRESSURE: 120 MMHG

## 2022-05-16 DIAGNOSIS — Z12.31 ENCOUNTER FOR SCREENING MAMMOGRAM FOR BREAST CANCER: ICD-10-CM

## 2022-05-16 DIAGNOSIS — N76.0 ACUTE VAGINITIS: ICD-10-CM

## 2022-05-16 DIAGNOSIS — Z01.419 ENCOUNTER FOR GYNECOLOGICAL EXAMINATION WITHOUT ABNORMAL FINDING: Primary | ICD-10-CM

## 2022-05-16 DIAGNOSIS — Z12.4 CERVICAL CANCER SCREENING: ICD-10-CM

## 2022-05-16 PROCEDURE — G0476 HPV COMBO ASSAY CA SCREEN: HCPCS | Performed by: PHYSICIAN ASSISTANT

## 2022-05-16 PROCEDURE — 99396 PREV VISIT EST AGE 40-64: CPT | Performed by: PHYSICIAN ASSISTANT

## 2022-05-16 PROCEDURE — 81514 NFCT DS BV&VAGINITIS DNA ALG: CPT | Performed by: PHYSICIAN ASSISTANT

## 2022-05-16 PROCEDURE — G0145 SCR C/V CYTO,THINLAYER,RESCR: HCPCS | Performed by: PHYSICIAN ASSISTANT

## 2022-05-16 NOTE — PROGRESS NOTES
Assessment/Plan:    No problem-specific Assessment & Plan notes found for this encounter  Diagnoses and all orders for this visit:    Encounter for gynecological examination without abnormal finding  -     Liquid-based pap, screening    Acute vaginitis  -     Molecular Vaginal Panel    Cervical cancer screening  -     Liquid-based pap, screening    Encounter for screening mammogram for breast cancer  -     Mammo screening bilateral w 3d & cad; Future        Pap and vaginal cultures done  We will call with results  Patient can start OTC Monistat 7 day vaginal cream for treatment  Order for mammogram for 2023 entered  Call if periods worsen or change  If no problems, patient to return in 1 year for routine gyn care  Subjective:      Patient ID: Ahsan Shah is a 42 y o  female  Patient is here for yearly gyn exam   States she is doing well overall  Periods are regular every 29 days, and bleeding lasts for 5 days  Notes an increase in dysmenorrhea but denies heavy bleeding  Has a history of recurrent BV  Complains of vaginal itching that started last night  She denies bowel/bladder changes, pelvic pain, bloating, abdominal pain, n/v, change in appetite, and thyroid disease  Mammogram in April was benign  She is performing self-breast exam   Denies new masses, skin changes, nipple discharge, and pain/tenderness  The following portions of the patient's history were reviewed and updated as appropriate: allergies, current medications, past family history, past medical history, past social history, past surgical history and problem list     Review of Systems   Constitutional: Negative for appetite change and unexpected weight change  Cardiovascular:        No masses, skin changes, nipple discharge, and pain/tenderness  Gastrointestinal: Negative for abdominal distention, abdominal pain, constipation, diarrhea, nausea and vomiting     Genitourinary: Negative for difficulty urinating, dysuria, frequency, genital sores, hematuria, menstrual problem, pelvic pain, urgency, vaginal bleeding, vaginal discharge and vaginal pain  Vaginal itching         Objective:      /76 (BP Location: Left arm, Patient Position: Sitting, Cuff Size: Standard)   Ht 5' 3" (1 6 m)   Wt 85 3 kg (188 lb)   LMP 05/06/2022   BMI 33 30 kg/m²          Physical Exam  Vitals reviewed  Exam conducted with a chaperone present  Constitutional:       Appearance: Normal appearance  She is well-developed  Neck:      Thyroid: No thyromegaly  Pulmonary:      Effort: Pulmonary effort is normal    Chest:   Breasts: Breasts are symmetrical       Right: Normal  No swelling, bleeding, inverted nipple, mass, nipple discharge, skin change, tenderness, axillary adenopathy or supraclavicular adenopathy  Left: Normal  No swelling, bleeding, inverted nipple, mass, nipple discharge, skin change, tenderness, axillary adenopathy or supraclavicular adenopathy  Abdominal:      General: Abdomen is flat  There is no distension  Palpations: Abdomen is soft  Tenderness: There is no abdominal tenderness  Genitourinary:     General: Normal vulva  Pubic Area: No rash  Labia:         Right: No rash, tenderness, lesion or injury  Left: No rash, tenderness, lesion or injury  Vagina: Normal  No vaginal discharge, erythema, tenderness or bleeding  Cervix: Normal       Uterus: Normal        Adnexa: Right adnexa normal and left adnexa normal         Right: No mass, tenderness or fullness  Left: No mass, tenderness or fullness  Musculoskeletal:      Cervical back: Neck supple  Lymphadenopathy:      Cervical: No cervical adenopathy  Upper Body:      Right upper body: No supraclavicular or axillary adenopathy  Left upper body: No supraclavicular or axillary adenopathy  Lower Body: No right inguinal adenopathy  No left inguinal adenopathy     Skin:     General: Skin is warm and dry  Neurological:      Mental Status: She is alert and oriented to person, place, and time  Psychiatric:         Mood and Affect: Mood normal          Behavior: Behavior normal  Behavior is cooperative  Thought Content:  Thought content normal          Judgment: Judgment normal

## 2022-05-16 NOTE — PATIENT INSTRUCTIONS
Mammogram due April 2023  Start OTC Monistat 7 day vaginal cream     Call if periods worsen or change

## 2022-05-17 LAB
C GLABRATA DNA VAG QL NAA+PROBE: NEGATIVE
C KRUSEI DNA VAG QL NAA+PROBE: NEGATIVE
CANDIDA SP 6 PNL VAG NAA+PROBE: NEGATIVE
HPV HR 12 DNA CVX QL NAA+PROBE: NEGATIVE
HPV16 DNA CVX QL NAA+PROBE: NEGATIVE
HPV18 DNA CVX QL NAA+PROBE: NEGATIVE
T VAGINALIS DNA VAG QL NAA+PROBE: NEGATIVE
VAGINOSIS/ITIS DNA PNL VAG PROBE+SIG AMP: NEGATIVE

## 2022-05-23 LAB
LAB AP GYN PRIMARY INTERPRETATION: NORMAL
Lab: NORMAL

## 2022-07-14 ENCOUNTER — OFFICE VISIT (OUTPATIENT)
Dept: OBGYN CLINIC | Facility: CLINIC | Age: 42
End: 2022-07-14
Payer: COMMERCIAL

## 2022-07-14 VITALS
DIASTOLIC BLOOD PRESSURE: 72 MMHG | HEIGHT: 63 IN | WEIGHT: 189 LBS | BODY MASS INDEX: 33.49 KG/M2 | SYSTOLIC BLOOD PRESSURE: 120 MMHG

## 2022-07-14 DIAGNOSIS — N89.8 VAGINAL LESION: ICD-10-CM

## 2022-07-14 DIAGNOSIS — Z11.3 SCREENING EXAMINATION FOR SEXUALLY TRANSMITTED DISEASE: ICD-10-CM

## 2022-07-14 DIAGNOSIS — N76.0 ACUTE VAGINITIS: Primary | ICD-10-CM

## 2022-07-14 PROCEDURE — 87510 GARDNER VAG DNA DIR PROBE: CPT | Performed by: PHYSICIAN ASSISTANT

## 2022-07-14 PROCEDURE — 87255 GENET VIRUS ISOLATE HSV: CPT | Performed by: PHYSICIAN ASSISTANT

## 2022-07-14 PROCEDURE — 87480 CANDIDA DNA DIR PROBE: CPT | Performed by: PHYSICIAN ASSISTANT

## 2022-07-14 PROCEDURE — 99214 OFFICE O/P EST MOD 30 MIN: CPT | Performed by: PHYSICIAN ASSISTANT

## 2022-07-14 PROCEDURE — 87660 TRICHOMONAS VAGIN DIR PROBE: CPT | Performed by: PHYSICIAN ASSISTANT

## 2022-07-14 PROCEDURE — 87491 CHLMYD TRACH DNA AMP PROBE: CPT | Performed by: PHYSICIAN ASSISTANT

## 2022-07-14 PROCEDURE — 87591 N.GONORRHOEAE DNA AMP PROB: CPT | Performed by: PHYSICIAN ASSISTANT

## 2022-07-14 NOTE — PROGRESS NOTES
Assessment/Plan:    No problem-specific Assessment & Plan notes found for this encounter  Diagnoses and all orders for this visit:    Acute vaginitis  -     Chlamydia/GC amplified DNA by PCR  -     VAGINOSIS DNA PROBE (AFFIRM)    Vaginal lesion  -     Herpes simplex virus culture    Screening examination for sexually transmitted disease  -     Chlamydia/GC amplified DNA by PCR        Perineum normal on exam   GC/chlamydia screening, vaginal cultures, and HSV cultures done  We will call with results and treat as necessary  Call if symptoms worsen or change  F/u to depend on results  Subjective:      Patient ID: Beena Peter is a 42 y o  female  Patient is here with complaint of possible vaginal infection  Symptoms started 2 days ago  She is experiencing pelvic cramping and an increase in discharge  States she also found a "boil" on her perineum that may have "burst"  Denies urinary symptoms, vaginal odor, itching, burning, abdominal pain, n/v, and fever/chills  She is sexually active with a monogamous partner; last had intercourse 3 weeks ago  The following portions of the patient's history were reviewed and updated as appropriate: allergies, current medications, past family history, past medical history, past social history, past surgical history and problem list     Review of Systems   Constitutional: Negative for chills and fever  Gastrointestinal: Negative for abdominal distention, abdominal pain, nausea and vomiting  Genitourinary: Positive for pelvic pain (Cramping) and vaginal discharge  Negative for difficulty urinating, dysuria, frequency, genital sores, hematuria, menstrual problem, urgency, vaginal bleeding and vaginal pain  Objective:      /72 (BP Location: Left arm, Patient Position: Sitting, Cuff Size: Adult)   Ht 5' 3" (1 6 m)   Wt 85 7 kg (189 lb)   LMP 07/01/2022 (Exact Date)   BMI 33 48 kg/m²          Physical Exam  Vitals reviewed     Constitutional: Appearance: Normal appearance  She is well-developed  Genitourinary:     General: Normal vulva  Pubic Area: No rash  Labia:         Right: No rash, tenderness, lesion or injury  Left: Lesion present  No rash, tenderness or injury  Vagina: Vaginal discharge (Moderate, yellow, watery) present  No erythema, tenderness or bleeding  Cervix: Normal       Uterus: Normal        Adnexa: Right adnexa normal and left adnexa normal         Right: No mass, tenderness or fullness  Left: No mass, tenderness or fullness  Comments: Two, several mm, punctate lesions on inner L labia minora at the vaginal opening  Lymphadenopathy:      Lower Body: No right inguinal adenopathy  No left inguinal adenopathy  Skin:     General: Skin is warm and dry  Neurological:      Mental Status: She is alert and oriented to person, place, and time  Psychiatric:         Mood and Affect: Mood normal          Behavior: Behavior normal  Behavior is cooperative  Thought Content:  Thought content normal          Judgment: Judgment normal

## 2022-07-15 LAB
C TRACH DNA SPEC QL NAA+PROBE: NEGATIVE
N GONORRHOEA DNA SPEC QL NAA+PROBE: NEGATIVE

## 2022-07-19 ENCOUNTER — TELEPHONE (OUTPATIENT)
Dept: OBGYN CLINIC | Facility: CLINIC | Age: 42
End: 2022-07-19

## 2022-07-19 DIAGNOSIS — N76.0 BV (BACTERIAL VAGINOSIS): Primary | ICD-10-CM

## 2022-07-19 DIAGNOSIS — B96.89 BV (BACTERIAL VAGINOSIS): Primary | ICD-10-CM

## 2022-07-19 LAB — HSV SPEC CULT: NORMAL

## 2022-07-19 RX ORDER — CLINDAMYCIN PHOSPHATE 20 MG/G
1 CREAM VAGINAL
Qty: 40 G | Refills: 0 | Status: SHIPPED | OUTPATIENT
Start: 2022-07-19 | End: 2022-07-26

## 2022-07-19 NOTE — TELEPHONE ENCOUNTER
Spoke with patient regarding vaginal cultures - positive for BV  Rx for Cleocin 2% vaginal cream daily x 7 days sent to pharmacy  Call if symptoms do not resolve

## 2022-10-12 PROBLEM — N12 PYELONEPHRITIS: Status: RESOLVED | Noted: 2021-04-06 | Resolved: 2022-10-12

## 2023-02-16 ENCOUNTER — OFFICE VISIT (OUTPATIENT)
Dept: OBGYN CLINIC | Facility: CLINIC | Age: 43
End: 2023-02-16

## 2023-02-16 VITALS
BODY MASS INDEX: 34.02 KG/M2 | WEIGHT: 192 LBS | DIASTOLIC BLOOD PRESSURE: 80 MMHG | SYSTOLIC BLOOD PRESSURE: 118 MMHG | HEIGHT: 63 IN

## 2023-02-16 DIAGNOSIS — N64.4 BREAST PAIN: Primary | ICD-10-CM

## 2023-02-16 NOTE — PROGRESS NOTES
Assessment/Plan:    No problem-specific Assessment & Plan notes found for this encounter  Diagnoses and all orders for this visit:    Breast pain  -     Mammo diagnostic bilateral w 3d & cad; Future        Order for b/l diagnostic mammogram entered  Recommended patient to use OTC eczema skin cream   Call if symptoms worsen or change  Subjective:      Patient ID: Saeed Longoria is a 36 y o  female  Patient is here with breast complaints  States symptoms started 3 days ago  Complains of R breast tenderness and itching skin over both breasts  Denies skin changes, rash, erythema, swelling, nipple discharge, and masses  No injury to R breast   Has not changed soap or laundry detergent recently  Has history of eczema  The following portions of the patient's history were reviewed and updated as appropriate: allergies, current medications, past family history, past medical history, past social history, past surgical history and problem list     Review of Systems   Constitutional: Negative for chills and fever  Cardiovascular:        R breast tenderness; b/l breast itching  No skin changes, rash, erythema, nipple discharge, swelling, and masses  Objective:      /80 (BP Location: Left arm, Patient Position: Sitting, Cuff Size: Adult)   Ht 5' 3" (1 6 m)   Wt 87 1 kg (192 lb)   LMP 02/05/2023 (Exact Date)   BMI 34 01 kg/m²          Physical Exam  Vitals reviewed  Constitutional:       Appearance: Normal appearance  She is well-developed  Chest:   Breasts:     Right: Normal  No swelling, bleeding, inverted nipple, mass, nipple discharge, skin change or tenderness  Left: Normal  No swelling, bleeding, inverted nipple, mass, nipple discharge, skin change or tenderness  Lymphadenopathy:      Cervical: No cervical adenopathy  Upper Body:      Right upper body: No supraclavicular or axillary adenopathy  Left upper body: No supraclavicular or axillary adenopathy     Skin: General: Skin is warm and dry  Neurological:      Mental Status: She is alert and oriented to person, place, and time  Psychiatric:         Mood and Affect: Mood normal          Behavior: Behavior normal  Behavior is cooperative  Thought Content:  Thought content normal          Judgment: Judgment normal

## 2023-05-11 ENCOUNTER — HOSPITAL ENCOUNTER (OUTPATIENT)
Dept: ULTRASOUND IMAGING | Facility: CLINIC | Age: 43
Discharge: HOME | End: 2023-05-11

## 2023-05-11 ENCOUNTER — TELEPHONE (OUTPATIENT)
Dept: OBGYN CLINIC | Facility: CLINIC | Age: 43
End: 2023-05-11

## 2023-05-11 ENCOUNTER — HOSPITAL ENCOUNTER (OUTPATIENT)
Dept: MAMMOGRAPHY | Facility: CLINIC | Age: 43
Discharge: HOME | End: 2023-05-11

## 2023-05-11 DIAGNOSIS — N64.4 BREAST PAIN: ICD-10-CM

## 2023-05-11 DIAGNOSIS — R92.8 ABNORMAL MAMMOGRAM: Primary | ICD-10-CM

## 2023-05-11 NOTE — TELEPHONE ENCOUNTER
Spoke with patient regarding R breast imaging  Asymmetry in R breast probably benign  Needs repeat R diagnostic mammogram in 6 mos  Order entered; patient to call for appointment  F/u in office next week as planned  Call with further questions

## 2023-05-13 ENCOUNTER — APPOINTMENT (EMERGENCY)
Dept: RADIOLOGY | Facility: HOSPITAL | Age: 43
End: 2023-05-13

## 2023-05-13 ENCOUNTER — HOSPITAL ENCOUNTER (EMERGENCY)
Facility: HOSPITAL | Age: 43
Discharge: HOME | End: 2023-05-13
Attending: EMERGENCY MEDICINE

## 2023-05-13 VITALS
OXYGEN SATURATION: 100 % | RESPIRATION RATE: 15 BRPM | TEMPERATURE: 98.5 F | SYSTOLIC BLOOD PRESSURE: 113 MMHG | DIASTOLIC BLOOD PRESSURE: 98 MMHG | HEART RATE: 77 BPM

## 2023-05-13 DIAGNOSIS — J20.8 VIRAL BRONCHITIS: Primary | ICD-10-CM

## 2023-05-13 LAB
ANION GAP SERPL CALCULATED.3IONS-SCNC: 5 MMOL/L (ref 4–13)
BASOPHILS # BLD AUTO: 0.05 THOUSANDS/ÂΜL (ref 0–0.1)
BASOPHILS NFR BLD AUTO: 1 % (ref 0–1)
BUN SERPL-MCNC: 13 MG/DL (ref 5–25)
CALCIUM SERPL-MCNC: 9.3 MG/DL (ref 8.4–10.2)
CHLORIDE SERPL-SCNC: 106 MMOL/L (ref 96–108)
CO2 SERPL-SCNC: 27 MMOL/L (ref 21–32)
CREAT SERPL-MCNC: 0.77 MG/DL (ref 0.6–1.3)
EOSINOPHIL # BLD AUTO: 0.15 THOUSAND/ÂΜL (ref 0–0.61)
EOSINOPHIL NFR BLD AUTO: 2 % (ref 0–6)
ERYTHROCYTE [DISTWIDTH] IN BLOOD BY AUTOMATED COUNT: 16 % (ref 11.6–15.1)
GFR SERPL CREATININE-BSD FRML MDRD: 94 ML/MIN/1.73SQ M
GLUCOSE SERPL-MCNC: 85 MG/DL (ref 65–140)
HCT VFR BLD AUTO: 35.4 % (ref 34.8–46.1)
HGB BLD-MCNC: 11.1 G/DL (ref 11.5–15.4)
IMM GRANULOCYTES # BLD AUTO: 0.03 THOUSAND/UL (ref 0–0.2)
IMM GRANULOCYTES NFR BLD AUTO: 0 % (ref 0–2)
LYMPHOCYTES # BLD AUTO: 2.49 THOUSANDS/ÂΜL (ref 0.6–4.47)
LYMPHOCYTES NFR BLD AUTO: 34 % (ref 14–44)
MCH RBC QN AUTO: 25.8 PG (ref 26.8–34.3)
MCHC RBC AUTO-ENTMCNC: 31.4 G/DL (ref 31.4–37.4)
MCV RBC AUTO: 82 FL (ref 82–98)
MONOCYTES # BLD AUTO: 0.6 THOUSAND/ÂΜL (ref 0.17–1.22)
MONOCYTES NFR BLD AUTO: 8 % (ref 4–12)
NEUTROPHILS # BLD AUTO: 4.05 THOUSANDS/ÂΜL (ref 1.85–7.62)
NEUTS SEG NFR BLD AUTO: 55 % (ref 43–75)
NRBC BLD AUTO-RTO: 0 /100 WBCS
PLATELET # BLD AUTO: 357 THOUSANDS/UL (ref 149–390)
PMV BLD AUTO: 9.7 FL (ref 8.9–12.7)
POTASSIUM SERPL-SCNC: 4.3 MMOL/L (ref 3.5–5.3)
PROCALCITONIN SERPL-MCNC: <0.05 NG/ML
RBC # BLD AUTO: 4.31 MILLION/UL (ref 3.81–5.12)
SODIUM SERPL-SCNC: 138 MMOL/L (ref 135–147)
WBC # BLD AUTO: 7.37 THOUSAND/UL (ref 4.31–10.16)

## 2023-05-13 RX ORDER — ALBUTEROL SULFATE 0.63 MG/3ML
0.63 SOLUTION RESPIRATORY (INHALATION) EVERY 6 HOURS PRN
Qty: 3 ML | Refills: 0 | Status: SHIPPED | OUTPATIENT
Start: 2023-05-13

## 2023-05-13 RX ORDER — PREDNISONE 50 MG/1
50 TABLET ORAL DAILY
Qty: 5 TABLET | Refills: 0 | Status: SHIPPED | OUTPATIENT
Start: 2023-05-13 | End: 2023-05-18 | Stop reason: ALTCHOICE

## 2023-05-13 RX ORDER — GUAIFENESIN 600 MG/1
600 TABLET, EXTENDED RELEASE ORAL EVERY 12 HOURS SCHEDULED
Qty: 30 TABLET | Refills: 0 | Status: SHIPPED | OUTPATIENT
Start: 2023-05-13

## 2023-05-13 RX ORDER — BENZONATATE 100 MG/1
100 CAPSULE ORAL EVERY 8 HOURS
Qty: 21 CAPSULE | Refills: 0 | Status: SHIPPED | OUTPATIENT
Start: 2023-05-13 | End: 2023-05-18 | Stop reason: ALTCHOICE

## 2023-05-14 LAB
ATRIAL RATE: 68 BPM
P AXIS: 69 DEGREES
PR INTERVAL: 140 MS
QRS AXIS: 52 DEGREES
QRSD INTERVAL: 92 MS
QT INTERVAL: 396 MS
QTC INTERVAL: 421 MS
T WAVE AXIS: 29 DEGREES
VENTRICULAR RATE: 68 BPM

## 2023-05-14 NOTE — ED PROVIDER NOTES
History  Chief Complaint   Patient presents with   • Cough     Pr presents to ED with a three week hx of cough and congestion, and mucous production, called pcp yesterday instructed to come to ED to rule out pneumonia     This is a 44-year-old female with past medical history significant for asthma presenting to the emergency department today for a productive cough  She also notes chest congestion but denies any chest pain or shortness of breath  Cough is productive to clear phlegm  She denies any fevers or chills  She denies any wheezing  She has been using her nebulizer and inhaler at home more often than she normally does  She denies any nasal congestion, rhinorrhea, sore throat, or ear pain  She has no lower extremity edema  She has no nausea, vomiting, diarrhea, constipation, dysuria, or hematuria  She was sent to the emergency department by her PCP office for rule out pneumonia  The patient denies other complaints at this time  History provided by:  Patient   used: No    Cough  Cough characteristics:  Productive  Sputum characteristics:  Clear  Severity:  Moderate  Onset quality:  Gradual  Duration:  3 weeks  Timing:  Intermittent  Progression:  Waxing and waning  Chronicity:  New  Relieved by:  Nothing  Worsened by:  Nothing  Associated symptoms: no chest pain, no chills, no diaphoresis, no ear fullness, no ear pain, no eye discharge, no fever, no headaches, no myalgias, no rash, no rhinorrhea, no shortness of breath, no sore throat, no weight loss and no wheezing        Prior to Admission Medications   Prescriptions Last Dose Informant Patient Reported? Taking? Cholecalciferol (VITAMIN D3 PO)   Yes No   Sig: Take 50,000 Units by mouth   albuterol (ACCUNEB) 0 63 MG/3ML nebulizer solution   Yes No   Sig: Take 1 ampule by nebulization every 6 (six) hours as needed for wheezing     albuterol (ACCUNEB) 0 63 MG/3ML nebulizer solution   No Yes   Sig: Take 3 mL (0 63 mg total) by nebulization every 6 (six) hours as needed for wheezing   albuterol (PROVENTIL HFA,VENTOLIN HFA) 90 mcg/act inhaler   Yes No   Sig: Inhale 2 puffs every 6 (six) hours as needed for wheezing   ibuprofen (MOTRIN) 600 mg tablet   No No   Sig: Take 1 tablet (600 mg total) by mouth every 6 (six) hours as needed for mild pain or moderate pain      Facility-Administered Medications: None       Past Medical History:   Diagnosis Date   • Asthma    • BRCA1 negative    • BRCA2 negative    • Endometriosis     neg   • History of mammogram 06/27/2017   • History of migraine headaches    • Ovarian cyst    • Vaginal Pap smear 06/09/2017    neg       Past Surgical History:   Procedure Laterality Date   • ECTOPIC PREGNANCY SURGERY     • HYSTEROSCOPY  11/23/2010    Hysteroscopy biopsy       Family History   Problem Relation Age of Onset   • Breast cancer Mother 22   • Kidney disease Mother    • Hypertension Brother    • Hyperlipidemia Brother    • Breast cancer Paternal Aunt         does not know   • Breast cancer Sister 50     I have reviewed and agree with the history as documented  E-Cigarette/Vaping   • E-Cigarette Use Never User      E-Cigarette/Vaping Substances   • Nicotine No    • THC No    • CBD No    • Flavoring No    • Other No    • Unknown No      Social History     Tobacco Use   • Smoking status: Never   • Smokeless tobacco: Never   Vaping Use   • Vaping Use: Never used   Substance Use Topics   • Alcohol use: Yes     Comment: occasionally-as per [place]   • Drug use: No       Review of Systems   Constitutional: Negative for appetite change, chills, diaphoresis, fatigue, fever and weight loss  HENT: Negative for ear pain, rhinorrhea and sore throat  Eyes: Negative for discharge and visual disturbance  Respiratory: Positive for cough  Negative for shortness of breath and wheezing  Cardiovascular: Negative for chest pain, palpitations and leg swelling     Gastrointestinal: Negative for abdominal pain, constipation, diarrhea, nausea and vomiting  Genitourinary: Negative for dysuria  Musculoskeletal: Negative for myalgias, neck pain and neck stiffness  Skin: Negative for rash and wound  Neurological: Negative for dizziness, seizures, syncope, weakness, light-headedness, numbness and headaches  Psychiatric/Behavioral: Negative for confusion  All other systems reviewed and are negative  Physical Exam  Physical Exam  Vitals and nursing note reviewed  Constitutional:       General: She is not in acute distress  Appearance: Normal appearance  She is normal weight  She is not ill-appearing, toxic-appearing or diaphoretic  HENT:      Head: Normocephalic and atraumatic  Nose: Nose normal  No congestion or rhinorrhea  Mouth/Throat:      Mouth: Mucous membranes are moist       Pharynx: No oropharyngeal exudate or posterior oropharyngeal erythema  Eyes:      General: No scleral icterus  Right eye: No discharge  Left eye: No discharge  Extraocular Movements: Extraocular movements intact  Pupils: Pupils are equal, round, and reactive to light  Cardiovascular:      Rate and Rhythm: Normal rate and regular rhythm  Pulses: Normal pulses  Heart sounds: Normal heart sounds  No murmur heard  No friction rub  No gallop  Pulmonary:      Effort: Pulmonary effort is normal  No respiratory distress  Breath sounds: Normal breath sounds  No stridor  No wheezing, rhonchi or rales  Comments: Lungs are clear to auscultation bilaterally without adventitious breath sounds  Chest:      Chest wall: No tenderness  Abdominal:      General: Abdomen is flat  There is no distension  Palpations: Abdomen is soft  Tenderness: There is no abdominal tenderness  There is no right CVA tenderness, left CVA tenderness, guarding or rebound  Musculoskeletal:         General: Normal range of motion  Cervical back: Normal range of motion  No tenderness  Right lower leg: No edema  Left lower leg: No edema  Comments: Negative Homans' sign bilaterally   Skin:     General: Skin is warm and dry  Capillary Refill: Capillary refill takes less than 2 seconds  Coloration: Skin is not jaundiced or pale  Neurological:      General: No focal deficit present  Mental Status: She is alert and oriented to person, place, and time  Mental status is at baseline     Psychiatric:         Mood and Affect: Mood normal          Behavior: Behavior normal          Vital Signs  ED Triage Vitals [05/13/23 1351]   Temperature Pulse Respirations Blood Pressure SpO2   98 5 °F (36 9 °C) 77 15 113/98 100 %      Temp src Heart Rate Source Patient Position - Orthostatic VS BP Location FiO2 (%)   -- Monitor Lying Left arm --      Pain Score       --           Vitals:    05/13/23 1351   BP: 113/98   Pulse: 77   Patient Position - Orthostatic VS: Lying         Visual Acuity      ED Medications  Medications - No data to display    Diagnostic Studies  Results Reviewed     Procedure Component Value Units Date/Time    Procalcitonin [[id number]]  (Normal) Collected: 05/13/23 1424    Lab Status: Final result Specimen: Blood from Arm, Right Updated: 05/13/23 1458     Procalcitonin <0 05 ng/ml     Basic metabolic panel [[id number]] Collected: 05/13/23 1424    Lab Status: Final result Specimen: Blood from Arm, Right Updated: 05/13/23 1451     Sodium 138 mmol/L      Potassium 4 3 mmol/L      Chloride 106 mmol/L      CO2 27 mmol/L      ANION GAP 5 mmol/L      BUN 13 mg/dL      Creatinine 0 77 mg/dL      Glucose 85 mg/dL      Calcium 9 3 mg/dL      eGFR 94 ml/min/1 73sq m     Narrative:      [place] guidelines for Chronic Kidney Disease (CKD):   •  Stage 1 with normal or high GFR (GFR > 90 mL/min/1 73 square meters)  •  Stage 2 Mild CKD (GFR = 60-89 mL/min/1 73 square meters)  •  Stage 3A Moderate CKD (GFR = 45-59 mL/min/1 73 square meters)  •  Stage 3B Moderate CKD (GFR = 30-44 mL/min/1 73 square meters)  •  Stage 4 Severe CKD (GFR = 15-29 mL/min/1 73 square meters)  •  Stage 5 End Stage CKD (GFR <15 mL/min/1 73 square meters)  Note: GFR calculation is accurate only with a steady state creatinine    CBC and differential [[id number]]  (Abnormal) Collected: 05/13/23 1424    Lab Status: Final result Specimen: Blood from Arm, Right Updated: 05/13/23 1431     WBC 7 37 Thousand/uL      RBC 4 31 Million/uL      Hemoglobin 11 1 g/dL      Hematocrit 35 4 %      MCV 82 fL      MCH 25 8 pg      MCHC 31 4 g/dL      RDW 16 0 %      MPV 9 7 fL      Platelets 307 Thousands/uL      nRBC 0 /100 WBCs      Neutrophils Relative 55 %      Immat GRANS % 0 %      Lymphocytes Relative 34 %      Monocytes Relative 8 %      Eosinophils Relative 2 %      Basophils Relative 1 %      Neutrophils Absolute 4 05 Thousands/µL      Immature Grans Absolute 0 03 Thousand/uL      Lymphocytes Absolute 2 49 Thousands/µL      Monocytes Absolute 0 60 Thousand/µL      Eosinophils Absolute 0 15 Thousand/µL      Basophils Absolute 0 05 Thousands/µL                  XR chest 2 views    (Results Pending)              Procedures  ECG 12 Lead Documentation Only    Date/Time: 5/13/2023 1:51 PM  Performed by: Saige Perera PA-C  Authorized by: Sruthi Alexander PA-C     Indications / Diagnosis:  Chest Congestion  Patient location:  ED  Previous ECG:     Previous ECG:  Unavailable  Interpretation:     Interpretation: normal    Rate:     ECG rate:  68    ECG rate assessment: normal    Rhythm:     Rhythm: sinus rhythm    Ectopy:     Ectopy: none    QRS:     QRS axis:  Normal  Conduction:     Conduction: normal    ST segments:     ST segments:  Normal  T waves:     T waves: normal    Comments:      Normal sinus rhythm with a rate of 68 without any ST segment changes or signs of ischemia  Normal axis  No ectopy               ED Course                               SBIRT 22yo+    Flowsheet Row Most "Recent Value   Initial Alcohol Screen: US AUDIT-C     1  How often do you have a drink containing alcohol? 0 Filed at: 05/13/2023 2941   2  How many drinks containing alcohol do you have on a typical day you are drinking? 0 Filed at: 05/13/2023 4403   3a  Male UNDER 65: How often do you have five or more drinks on one occasion? 0 Filed at: 05/13/2023 1353   3b  FEMALE Any Age, or MALE 65+: How often do you have 4 or more drinks on one occassion? 0 Filed at: 05/13/2023 1353   Audit-C Score 0 Filed at: 05/13/2023 1353   NEETA: How many times in the past year have you    Used an illegal drug or used a prescription medication for non-medical reasons? Never Filed at: 05/13/2023 7704                    Medical Decision Making  This is a 58-year-old female presenting to the emergency department today for cough and chest congestion  Ongoing intermittently for 3 weeks  Cough is productive to clear phlegm  Vital signs are stable  On physical examination, the patient's lungs are clear to auscultation bilaterally without adventitious breath sounds  She has a negative Homans' sign bilaterally  EKG shows normal sinus rhythm with a rate of 68  Chest x-ray shows no acute cardiopulmonary disease on my independent interpretation  Labs appear reassuring  The patient is stable for discharge at this time  Tessalon Perles, Mucinex, and prednisone sent to the patient's pharmacy  I also refilled the patient's nebulized solution  Follow-up with PCP  Strict return precautions were given  Recommend PCP follow-up as soon as possible  The patient and/or patient's proxy verify their understanding and agree to the plan at this time  All questions answered to the patient and/or their proxy's satisfaction  All labs reviewed and utilized in the medical decision making process (if labs were ordered)    Portions of the record may have been created with voice recognition software   Occasional wrong word or \"sound a like\" substitutions " may have occurred due to the inherent limitations of voice recognition software   Read the chart carefully and recognize, using context, where substitutions have occurred  Viral bronchitis: complicated acute illness or injury  Amount and/or Complexity of Data Reviewed  Labs: ordered  Decision-making details documented in ED Course  Radiology: ordered  Decision-making details documented in ED Course  Details: CXR  ECG/medicine tests: ordered  Decision-making details documented in ED Course  Risk  Prescription drug management  Disposition  Final diagnoses:   Viral bronchitis     Time reflects when diagnosis was documented in both MDM as applicable and the Disposition within this note     Time User Action Codes Description Comment    5/13/2023  3:04 PM Uma Alexander Add [J20 [id number]] Viral bronchitis       ED Disposition     ED Disposition   Discharge    Condition   Stable    Date/Time   Sat May 13, 2023 1504    Comment   Cheyenne Amador discharge to home/self care                 Follow-up Information     Follow up With Specialties Details Why Contact Info Additional [address] [facility] Schedule an appointment as soon as possible for a visit   [address] [place] [address] Emergency Department Emergency Medicine Go to  If symptoms worsen [address] [facility],[address] [phone]  [address] [place] [address] Emergency Department, [address], [address] [place] [address] Rd          Discharge Medication List as of 5/13/2023  3:05 PM      START taking these medications    Details   benzonatate (TESSALON PERLES) 100 mg capsule Take 1 capsule (100 mg total) by mouth every 8 (eight) hours, Starting Sat 5/13/2023, Normal      guaiFENesin (MUCINEX) 600 mg 12 hr tablet Take 1 tablet (600 mg total) by mouth every 12 (twelve) hours, Starting Sat 5/13/2023, Normal      predniSONE 50 mg tablet Take 1 tablet (50 mg total) by mouth daily, Starting Sat 5/13/2023, Normal         CONTINUE these medications which have CHANGED    Details   albuterol (ACCUNEB) 0 63 MG/3ML nebulizer solution Take 3 mL (0 63 mg total) by nebulization every 6 (six) hours as needed for wheezing, Starting Sat 5/13/2023, Normal         CONTINUE these medications which have NOT CHANGED    Details   albuterol (PROVENTIL HFA,VENTOLIN HFA) 90 mcg/act inhaler Inhale 2 puffs every 6 (six) hours as needed for wheezing, Historical Med      Cholecalciferol (VITAMIN D3 PO) Take 50,000 Units by mouth, Historical Med      ibuprofen (MOTRIN) 600 mg tablet Take 1 tablet (600 mg total) by mouth every 6 (six) hours as needed for mild pain or moderate pain, Starting Tue 4/6/2021, Normal             No discharge procedures on file      PDMP Review     None          ED Provider  Electronically Signed by           Breann Sharp PA-C  05/13/23 2017

## 2023-05-18 ENCOUNTER — ANNUAL EXAM (OUTPATIENT)
Dept: OBGYN CLINIC | Facility: CLINIC | Age: 43
End: 2023-05-18

## 2023-05-18 VITALS
HEIGHT: 63 IN | WEIGHT: 193 LBS | DIASTOLIC BLOOD PRESSURE: 64 MMHG | BODY MASS INDEX: 34.2 KG/M2 | SYSTOLIC BLOOD PRESSURE: 112 MMHG

## 2023-05-18 DIAGNOSIS — Z01.411 ENCOUNTER FOR GYNECOLOGICAL EXAMINATION WITH ABNORMAL FINDING: Primary | ICD-10-CM

## 2023-05-18 DIAGNOSIS — N76.0 RECURRENT VAGINITIS: ICD-10-CM

## 2023-05-18 NOTE — PROGRESS NOTES
Assessment/Plan:    No problem-specific Assessment & Plan notes found for this encounter  Diagnoses and all orders for this visit:    Encounter for gynecological examination with abnormal finding  -     Liquid-based pap, screening    Recurrent vaginitis  -     VAGINOSIS DNA PROBE (AFFIRM)        Pap and vaginal cultures done  We will call with results and treat as necessary  Order for R breast imaging already in Epic  Call if periods worsen or change  If no problems, patient to return in 1 year for routine gyn care  Subjective:      Patient ID: Ronan Peterson is a 36 y o  female  Patient is here for yearly gyn exam   States she is doing well overall  Periods are regular every 28-30 days, and bleeding lasts for 5 days  She denies heavy bleeding, severe cramping, HA, and mood symptoms  She is sexually active with a monogamous partner  Complains of vaginal discharge and itching since yesterday  Has a history of recurrent vaginal infection  Denies bowel/bladder changes, pelvic pain, bloating, abdominal pain, n/v, change in appetite, and thyroid disease  Diagnostic mammogram last week showed stability in R breast asymmetry; f/u R breast diagnostic mammogram in 6 mos  Denies new masses, skin changes, nipple discharge, and pain/tenderness  The following portions of the patient's history were reviewed and updated as appropriate: allergies, current medications, past family history, past medical history, past social history, past surgical history and problem list     Review of Systems   Constitutional: Negative for appetite change and unexpected weight change  Cardiovascular:        No masses, skin changes, nipple discharge, and pain/tenderness  Gastrointestinal: Negative for abdominal distention, abdominal pain, constipation, diarrhea, nausea and vomiting  Genitourinary: Positive for vaginal discharge   Negative for difficulty urinating, dysuria, frequency, genital sores, hematuria, menstrual "problem, pelvic pain, urgency, vaginal bleeding and vaginal pain  Vaginal itching         Objective:      /64 (BP Location: Left arm, Patient Position: Sitting, Cuff Size: Adult)   Ht 5' 3\" (1 6 m)   Wt 87 5 kg (193 lb)   LMP 04/28/2023   BMI 34 19 kg/m²          Physical Exam  Vitals reviewed  Exam conducted with a chaperone present  Constitutional:       Appearance: Normal appearance  She is well-developed  Neck:      Thyroid: No thyromegaly  Pulmonary:      Effort: Pulmonary effort is normal    Chest:   Breasts:     Breasts are symmetrical       Right: Normal  No swelling, bleeding, inverted nipple, mass, nipple discharge, skin change or tenderness  Left: Normal  No swelling, bleeding, inverted nipple, mass, nipple discharge, skin change or tenderness  Abdominal:      General: Abdomen is flat  There is no distension  Palpations: Abdomen is soft  Tenderness: There is no abdominal tenderness  Genitourinary:     General: Normal vulva  Pubic Area: No rash  Labia:         Right: No rash, tenderness, lesion or injury  Left: No rash, tenderness, lesion or injury  Vagina: Normal  No vaginal discharge, erythema, tenderness or bleeding  Cervix: Normal       Uterus: Normal        Adnexa: Right adnexa normal and left adnexa normal         Right: No mass, tenderness or fullness  Left: No mass, tenderness or fullness  Musculoskeletal:      Cervical back: Neck supple  Lymphadenopathy:      Cervical: No cervical adenopathy  Upper Body:      Right upper body: No supraclavicular or axillary adenopathy  Left upper body: No supraclavicular or axillary adenopathy  Lower Body: No right inguinal adenopathy  No left inguinal adenopathy  Skin:     General: Skin is warm and dry  Neurological:      Mental Status: She is alert and oriented to person, place, and time     Psychiatric:         Mood and Affect: Mood normal          Behavior: " Behavior normal  Behavior is cooperative  Thought Content:  Thought content normal          Judgment: Judgment normal

## 2023-05-19 LAB
CANDIDA RRNA VAG QL PROBE: NEGATIVE
G VAGINALIS RRNA GENITAL QL PROBE: NEGATIVE
T VAGINALIS RRNA GENITAL QL PROBE: NEGATIVE

## 2023-05-22 LAB
HPV HR 12 DNA CVX QL NAA+PROBE: POSITIVE
HPV16 DNA CVX QL NAA+PROBE: NEGATIVE
HPV18 DNA CVX QL NAA+PROBE: NEGATIVE

## 2023-05-26 LAB
LAB AP GYN PRIMARY INTERPRETATION: NORMAL
Lab: NORMAL

## 2023-06-01 ENCOUNTER — TELEPHONE (OUTPATIENT)
Dept: OBGYN CLINIC | Facility: CLINIC | Age: 43
End: 2023-06-01

## 2023-06-01 NOTE — TELEPHONE ENCOUNTER
Spoke with patient regarding Pap results - negative cytology but positive for other HR HPV  Per [facility] guidelines, repeat Pap and cotesting in 1 year  Call with further questions

## 2023-08-21 ENCOUNTER — APPOINTMENT (EMERGENCY)
Dept: RADIOLOGY | Facility: HOSPITAL | Age: 43
End: 2023-08-21
Payer: COMMERCIAL

## 2023-08-21 ENCOUNTER — HOSPITAL ENCOUNTER (EMERGENCY)
Facility: HOSPITAL | Age: 43
Discharge: HOME | End: 2023-08-21
Attending: EMERGENCY MEDICINE
Payer: COMMERCIAL

## 2023-08-21 VITALS
TEMPERATURE: 98.7 F | HEART RATE: 80 BPM | SYSTOLIC BLOOD PRESSURE: 127 MMHG | WEIGHT: 180 LBS | OXYGEN SATURATION: 97 % | RESPIRATION RATE: 16 BRPM | BODY MASS INDEX: 31.89 KG/M2 | HEIGHT: 63 IN | DIASTOLIC BLOOD PRESSURE: 75 MMHG

## 2023-08-21 DIAGNOSIS — S29.012A STRAIN OF THORACIC BACK REGION: Primary | ICD-10-CM

## 2023-08-21 DIAGNOSIS — D64.9 ANEMIA: ICD-10-CM

## 2023-08-21 LAB
ANION GAP SERPL CALCULATED.3IONS-SCNC: 6 MMOL/L
ATRIAL RATE: 75 BPM
BASOPHILS # BLD AUTO: 0.04 THOUSANDS/ÂΜL (ref 0–0.1)
BASOPHILS NFR BLD AUTO: 1 % (ref 0–1)
BUN SERPL-MCNC: 11 MG/DL (ref 5–25)
CALCIUM SERPL-MCNC: 8.8 MG/DL (ref 8.4–10.2)
CHLORIDE SERPL-SCNC: 107 MMOL/L (ref 96–108)
CO2 SERPL-SCNC: 26 MMOL/L (ref 21–32)
CREAT SERPL-MCNC: 0.72 MG/DL (ref 0.6–1.3)
D DIMER PPP FEU-MCNC: 0.32 UG/ML FEU
EOSINOPHIL # BLD AUTO: 0.19 THOUSAND/ÂΜL (ref 0–0.61)
EOSINOPHIL NFR BLD AUTO: 3 % (ref 0–6)
ERYTHROCYTE [DISTWIDTH] IN BLOOD BY AUTOMATED COUNT: 15 % (ref 11.6–15.1)
GFR SERPL CREATININE-BSD FRML MDRD: 102 ML/MIN/1.73SQ M
GLUCOSE SERPL-MCNC: 94 MG/DL (ref 65–140)
HCG SERPL QL: NEGATIVE
HCT VFR BLD AUTO: 35.2 % (ref 34.8–46.1)
HGB BLD-MCNC: 10.9 G/DL (ref 11.5–15.4)
IMM GRANULOCYTES # BLD AUTO: 0.02 THOUSAND/UL (ref 0–0.2)
IMM GRANULOCYTES NFR BLD AUTO: 0 % (ref 0–2)
LYMPHOCYTES # BLD AUTO: 2.25 THOUSANDS/ÂΜL (ref 0.6–4.47)
LYMPHOCYTES NFR BLD AUTO: 33 % (ref 14–44)
MCH RBC QN AUTO: 25.5 PG (ref 26.8–34.3)
MCHC RBC AUTO-ENTMCNC: 31 G/DL (ref 31.4–37.4)
MCV RBC AUTO: 82 FL (ref 82–98)
MONOCYTES # BLD AUTO: 0.63 THOUSAND/ÂΜL (ref 0.17–1.22)
MONOCYTES NFR BLD AUTO: 9 % (ref 4–12)
NEUTROPHILS # BLD AUTO: 3.62 THOUSANDS/ÂΜL (ref 1.85–7.62)
NEUTS SEG NFR BLD AUTO: 54 % (ref 43–75)
NRBC BLD AUTO-RTO: 0 /100 WBCS
P AXIS: 50 DEGREES
PLATELET # BLD AUTO: 373 THOUSANDS/UL (ref 149–390)
PMV BLD AUTO: 9.1 FL (ref 8.9–12.7)
POTASSIUM SERPL-SCNC: 3.7 MMOL/L (ref 3.5–5.3)
PR INTERVAL: 146 MS
QRS AXIS: 46 DEGREES
QRSD INTERVAL: 96 MS
QT INTERVAL: 414 MS
QTC INTERVAL: 462 MS
RBC # BLD AUTO: 4.28 MILLION/UL (ref 3.81–5.12)
SODIUM SERPL-SCNC: 139 MMOL/L (ref 135–147)
T WAVE AXIS: 23 DEGREES
VENTRICULAR RATE: 75 BPM
WBC # BLD AUTO: 6.75 THOUSAND/UL (ref 4.31–10.16)

## 2023-08-21 PROCEDURE — 96374 THER/PROPH/DIAG INJ IV PUSH: CPT

## 2023-08-21 PROCEDURE — 71046 X-RAY EXAM CHEST 2 VIEWS: CPT

## 2023-08-21 PROCEDURE — 85025 COMPLETE CBC W/AUTO DIFF WBC: CPT | Performed by: EMERGENCY MEDICINE

## 2023-08-21 PROCEDURE — 93005 ELECTROCARDIOGRAM TRACING: CPT

## 2023-08-21 PROCEDURE — 84703 CHORIONIC GONADOTROPIN ASSAY: CPT | Performed by: EMERGENCY MEDICINE

## 2023-08-21 PROCEDURE — 99284 EMERGENCY DEPT VISIT MOD MDM: CPT

## 2023-08-21 PROCEDURE — 85379 FIBRIN DEGRADATION QUANT: CPT | Performed by: EMERGENCY MEDICINE

## 2023-08-21 PROCEDURE — 93010 ELECTROCARDIOGRAM REPORT: CPT | Performed by: INTERNAL MEDICINE

## 2023-08-21 PROCEDURE — 72070 X-RAY EXAM THORAC SPINE 2VWS: CPT

## 2023-08-21 PROCEDURE — 36415 COLL VENOUS BLD VENIPUNCTURE: CPT | Performed by: EMERGENCY MEDICINE

## 2023-08-21 PROCEDURE — 99285 EMERGENCY DEPT VISIT HI MDM: CPT | Performed by: EMERGENCY MEDICINE

## 2023-08-21 PROCEDURE — 80048 BASIC METABOLIC PNL TOTAL CA: CPT | Performed by: EMERGENCY MEDICINE

## 2023-08-21 RX ORDER — LIDOCAINE 50 MG/G
2 PATCH TOPICAL ONCE
Status: DISCONTINUED | OUTPATIENT
Start: 2023-08-21 | End: 2023-08-21 | Stop reason: HOSPADM

## 2023-08-21 RX ORDER — METHOCARBAMOL 500 MG/1
500 TABLET, FILM COATED ORAL 3 TIMES DAILY PRN
Qty: 21 TABLET | Refills: 0 | Status: SHIPPED | OUTPATIENT
Start: 2023-08-21

## 2023-08-21 RX ORDER — ACETAMINOPHEN 500 MG
1000 TABLET ORAL EVERY 6 HOURS PRN
Qty: 40 TABLET | Refills: 0 | Status: SHIPPED | OUTPATIENT
Start: 2023-08-21

## 2023-08-21 RX ORDER — LIDOCAINE 50 MG/G
2 PATCH TOPICAL DAILY
Qty: 30 PATCH | Refills: 0 | Status: SHIPPED | OUTPATIENT
Start: 2023-08-21

## 2023-08-21 RX ORDER — KETOROLAC TROMETHAMINE 30 MG/ML
15 INJECTION, SOLUTION INTRAMUSCULAR; INTRAVENOUS ONCE
Status: COMPLETED | OUTPATIENT
Start: 2023-08-21 | End: 2023-08-21

## 2023-08-21 RX ORDER — ACETAMINOPHEN 325 MG/1
975 TABLET ORAL ONCE
Status: COMPLETED | OUTPATIENT
Start: 2023-08-21 | End: 2023-08-21

## 2023-08-21 RX ORDER — NAPROXEN 500 MG/1
500 TABLET ORAL 2 TIMES DAILY WITH MEALS
Qty: 30 TABLET | Refills: 0 | Status: SHIPPED | OUTPATIENT
Start: 2023-08-21

## 2023-08-21 RX ADMIN — LIDOCAINE 2 PATCH: 50 PATCH TOPICAL at 13:02

## 2023-08-21 RX ADMIN — ACETAMINOPHEN 975 MG: 325 TABLET, FILM COATED ORAL at 12:59

## 2023-08-21 RX ADMIN — KETOROLAC TROMETHAMINE 15 MG: 30 INJECTION, SOLUTION INTRAMUSCULAR; INTRAVENOUS at 12:59

## 2023-08-21 NOTE — DISCHARGE INSTRUCTIONS
Is a Tylenol every 6 hours, or naproxen every 12 hours. Use the Robaxin up to 3 times daily. This medication will make you tired. Do not take it and drive. Follow-up with comprehensive spine as soon as possible. Come back to the emergency department for new or worsening symptoms including but not limited to numbness between your legs, urinary or bowel problems. Work today revealed a very mild anemia. Follow-up with your primary care provider regarding this finding.
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
## 2023-08-22 ENCOUNTER — TELEPHONE (OUTPATIENT)
Dept: PHYSICAL THERAPY | Facility: OTHER | Age: 43
End: 2023-08-22
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
## 2023-11-17 ENCOUNTER — TELEPHONE (OUTPATIENT)
Dept: MAMMOGRAPHY | Facility: CLINIC | Age: 43
End: 2023-11-17

## 2023-11-22 ENCOUNTER — TELEPHONE (OUTPATIENT)
Dept: MAMMOGRAPHY | Facility: CLINIC | Age: 43
End: 2023-11-22

## 2023-11-22 NOTE — TELEPHONE ENCOUNTER
2nd attempt calling patient to schedule follow up right diagnostic mammogram, message left with contact information for patient to call back to schedule dx imaging.

## 2023-11-27 ENCOUNTER — TELEPHONE (OUTPATIENT)
Dept: MAMMOGRAPHY | Facility: CLINIC | Age: 43
End: 2023-11-27

## 2023-11-27 NOTE — TELEPHONE ENCOUNTER
I have left messages with contact information for patient on 11/17, 11/22 and 11/27 to schedule her 6 month follow up right diagnostic mammogram from 5/11/2023, with no response from patient to schedule.

## 2023-11-27 NOTE — TELEPHONE ENCOUNTER
I spoke with patient this morning. She said she will try to reach you to schedule, or you can give her a call. She will be expecting you. Thank you!

## 2023-12-01 ENCOUNTER — HOSPITAL ENCOUNTER (OUTPATIENT)
Dept: MAMMOGRAPHY | Facility: CLINIC | Age: 43
Discharge: HOME | End: 2023-12-01
Payer: COMMERCIAL

## 2023-12-01 VITALS — BODY MASS INDEX: 31.89 KG/M2 | HEIGHT: 63 IN | WEIGHT: 180 LBS

## 2023-12-01 DIAGNOSIS — R92.8 ABNORMAL MAMMOGRAM: ICD-10-CM

## 2023-12-01 PROCEDURE — 77065 DX MAMMO INCL CAD UNI: CPT

## 2023-12-01 PROCEDURE — G0279 TOMOSYNTHESIS, MAMMO: HCPCS

## 2024-02-18 ENCOUNTER — HOSPITAL ENCOUNTER (EMERGENCY)
Facility: HOSPITAL | Age: 44
Discharge: HOME | End: 2024-02-18
Attending: EMERGENCY MEDICINE
Payer: COMMERCIAL

## 2024-02-18 ENCOUNTER — APPOINTMENT (EMERGENCY)
Dept: RADIOLOGY | Facility: HOSPITAL | Age: 44
End: 2024-02-18
Payer: COMMERCIAL

## 2024-02-18 VITALS
OXYGEN SATURATION: 100 % | DIASTOLIC BLOOD PRESSURE: 104 MMHG | HEART RATE: 94 BPM | RESPIRATION RATE: 18 BRPM | TEMPERATURE: 98.3 F | SYSTOLIC BLOOD PRESSURE: 155 MMHG

## 2024-02-18 DIAGNOSIS — S62.609A FINGER FRACTURE, LEFT: Primary | ICD-10-CM

## 2024-02-18 PROCEDURE — 99284 EMERGENCY DEPT VISIT MOD MDM: CPT | Performed by: EMERGENCY MEDICINE

## 2024-02-18 PROCEDURE — 73130 X-RAY EXAM OF HAND: CPT

## 2024-02-18 PROCEDURE — 99283 EMERGENCY DEPT VISIT LOW MDM: CPT

## 2024-02-18 RX ORDER — ACETAMINOPHEN 325 MG/1
975 TABLET ORAL ONCE
Status: COMPLETED | OUTPATIENT
Start: 2024-02-18 | End: 2024-02-18

## 2024-02-18 RX ADMIN — ACETAMINOPHEN 975 MG: 325 TABLET, FILM COATED ORAL at 21:49

## 2024-02-19 ENCOUNTER — TELEPHONE (OUTPATIENT)
Age: 44
End: 2024-02-19

## 2024-02-19 NOTE — TELEPHONE ENCOUNTER
Patient is being referred to a orthopedics. Please schedule accordingly.    [facility]   [phone]

## 2024-02-19 NOTE — DISCHARGE INSTRUCTIONS
Blood pressure was elevated in the emergency department. Follow up with orthopedics/outpatient providers, and return to the emergency department for new or worsening symptoms.
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
## 2024-02-22 ENCOUNTER — OFFICE VISIT (OUTPATIENT)
Dept: OBGYN CLINIC | Facility: MEDICAL CENTER | Age: 44
End: 2024-02-22
Payer: COMMERCIAL

## 2024-02-22 VITALS
SYSTOLIC BLOOD PRESSURE: 120 MMHG | HEART RATE: 64 BPM | BODY MASS INDEX: 36.32 KG/M2 | HEIGHT: 63 IN | DIASTOLIC BLOOD PRESSURE: 81 MMHG | WEIGHT: 205 LBS

## 2024-02-22 DIAGNOSIS — Q78.4 OLLIER'S DISEASE: Primary | ICD-10-CM

## 2024-02-22 DIAGNOSIS — Q78.4: ICD-10-CM

## 2024-02-22 DIAGNOSIS — S62.609A FINGER FRACTURE, LEFT: ICD-10-CM

## 2024-02-22 PROBLEM — J45.20 MILD INTERMITTENT ASTHMA WITHOUT COMPLICATION: Status: ACTIVE | Noted: 2022-05-18

## 2024-02-22 PROCEDURE — 99245 OFF/OP CONSLTJ NEW/EST HI 55: CPT | Performed by: STUDENT IN AN ORGANIZED HEALTH CARE EDUCATION/TRAINING PROGRAM

## 2024-02-22 NOTE — PROGRESS NOTES
Orthopedic Surgery Office Note  Cheyenne Amador (43 y.o. female)  : 1980 Encounter Date: 2024  Dr. Camden Garcia, DO, Orthopedic Surgeon  [facility]   Phone:[phone] Fax:[phone]    Assessment and Plan: Cheyenne Amador is a 43 y.o. female with:    1.  Ollier's Disease, multiple enchondromatosis   - whole body bone scan for further evaluation   - if other lesions present, will obtain XR of them and start a monitoring schedule for them  - discussed increased risk of enchondroma transformation/dedifferentiation and therefore the importance to continue routine monitoring     2. Left index middle phalanx fracture  - maintain splint  - educated on dino taping, advised against bending finger for the next few weeks   - discussed as fracture through lesion  - no indication for surgery at this time; there is an option for orthopedic oncology surgical fixation based on lesion and fracture if no healing after period of time  -she also desires no surgery at this time  - continue routine XR     3. Comorbidity, including: severe history of asthma, previously managed with prednisone, no longer routinely   - continue current management     Procedure:  No procedures performed    Surgical Planning:   No surgery planned at this time    Follow up: Return in about 4 weeks (around 3/21/2024).   __________________________________________________________________    History of Present Illness:     Cheyenne Amador is a RHD 43 y.o. female with history of prior fracture through fibrous dysplasia per pt and hx of severe asthma with long-term prednisone use as a child who presents for consultation at the request of Cain Silva MD from ED presentation on  regarding left index finger pain following trauma.     She was told in the ED about multiple holes in the bones of her hand, referred to us for further evaluation.     Stabbing pain with motion to the hand. She endorses that finger pain has  "already improved.   She is a hairdresser, and has been unable to return to work    At baseline patient gaits without assistance.  No noted constitutional symptoms such as fever, chills, night sweats, fatigue, weight gains/losses. No noted  chest pain/shortness of breath.  Patient has no noted   personal history of cancer.    Occupation: hairdresser,      Review of Systems:   Allergies, medications, past medical/surgical/family/social history have been reviewed.  Complete 12 system review performed and found to be negative except: except as per mentioned in HPI.    Physical Examination:   Height: 5' 3\" (160 cm)  Weight - Scale: 93 kg (205 lb)  BMI (Calculated): 36.3  BSA (Calculated - m2): 1.95 sq meters     Vitals:    02/22/24 1025   BP: 120/81   Pulse: 64     Body mass index is 36.31 kg/m².    General: alert and oriented; well nourished/well developed; no apparent distress.   Present unaccompanied today  Psychiatric: normal mood and affect  HEENT: NCAT. Head/neck - full range of motion.   Lungs: breathing comfortably; equal symmetric chest expansion.   Abdomen: soft, non-tender, non-distended.   Skin: warm; dry; no lesions, rashes, petechiae or purpura; no clubbing, no cyanosis, no edema, - palpable masses.    Extremity: left index finger   Inspection: no edema, skin abnormalities throughout   Palpation: no palpable masses or lesions, TTP   Range of motion of joints: WFL range of motion all extremities.   Motor strength: WNL all extremities.  limited due to splint/pain.    Sensation: grossly intact to all extremities.    Pulses: intact    Lymphatics: (no obvious) lymphadenopathy  Gait: normal gait.    IMAGING RESULTS, All images personally review today by Dr. Garcia  Study: XR left hand  Date: 2/18/24  Report: I have read and agree with the radiologist report.  My impression is as follows:   Multiple mostly lucent lesions are seen in the hand, most prominently in the metacarpals and phalanges, nonspecific " with differential considerations including fibrous dysplasia, multiple enchondromas or eosinophilic granulomas. Superimposed metabolic   process such as hyperparathyroidism could have this appearance as could malignant process including metastatic disease/myeloma although these are felt to be less likely.     Given the appearance, multiple enchondromas is the favored differential consideration, although clinical follow-up and correlation with the patient's history and laboratory values is recommended nonemergently to exclude other processes.     In addition there is buckling of the cortex as well as an oblique lucency through one of these lucent lesions in the metadiaphysis of the middle phalanx of the index digit; consistent with a fracture without significant displacement.       Patient Care team:   Patient Care Team:  Lottie Bello MD as PCP - General (Internal Medicine)     Past Medical History:   Diagnosis Date    Asthma     BRCA1 negative     BRCA2 negative     Endometriosis     neg    History of mammogram 06/27/2017    History of migraine headaches     Ovarian cyst     Vaginal Pap smear 06/09/2017    neg     Past Surgical History:   Procedure Laterality Date    ECTOPIC PREGNANCY SURGERY      HYSTEROSCOPY  11/23/2010    Hysteroscopy biopsy       Current Outpatient Medications:     acetaminophen (TYLENOL) 500 mg tablet, Take 2 tablets (1,000 mg total) by mouth every 6 (six) hours as needed for moderate pain, Disp: 40 tablet, Rfl: 0    albuterol (ACCUNEB) 0.63 MG/3ML nebulizer solution, Take 3 mL (0.63 mg total) by nebulization every 6 (six) hours as needed for wheezing, Disp: 3 mL, Rfl: 0    albuterol (PROVENTIL HFA,VENTOLIN HFA) 90 mcg/act inhaler, Inhale 2 puffs every 6 (six) hours as needed for wheezing, Disp: , Rfl:     Cholecalciferol (VITAMIN D3 PO), Take 50,000 Units by mouth, Disp: , Rfl:     lidocaine (Lidoderm) 5 %, Apply 2 patches topically over 12 hours daily Remove & Discard patch within 12  hours or as directed by MD, Disp: 30 patch, Rfl: 0    guaiFENesin (MUCINEX) 600 mg 12 hr tablet, Take 1 tablet (600 mg total) by mouth every 12 (twelve) hours (Patient not taking: Reported on 2/22/2024), Disp: 30 tablet, Rfl: 0    ibuprofen (MOTRIN) 600 mg tablet, Take 1 tablet (600 mg total) by mouth every 6 (six) hours as needed for mild pain or moderate pain (Patient not taking: Reported on 2/22/2024), Disp: 60 tablet, Rfl: 0    methocarbamol (ROBAXIN) 500 mg tablet, Take 1 tablet (500 mg total) by mouth 3 (three) times a day as needed for muscle spasms (Patient not taking: Reported on 2/22/2024), Disp: 21 tablet, Rfl: 0    naproxen (Naprosyn) 500 mg tablet, Take 1 tablet (500 mg total) by mouth 2 (two) times a day with meals (Patient not taking: Reported on 2/22/2024), Disp: 30 tablet, Rfl: 0  Allergies   Allergen Reactions    Aspirin Shortness Of Breath    Shellfish-Derived Products - Food Allergy Anaphylaxis     severe  hives  throat closes    Sulfa Antibiotics Hives and Itching     Family History   Problem Relation Age of Onset    Breast cancer Mother 25    Kidney disease Mother     Hypertension Brother     Hyperlipidemia Brother     Breast cancer Paternal Aunt         does not know    Breast cancer Sister 48     Social History     Socioeconomic History    Marital status:      Spouse name: Not on file    Number of children: Not on file    Years of education: 2 year college     Highest education level: Not on file   Occupational History    Occupation:    Tobacco Use    Smoking status: Never    Smokeless tobacco: Never   Vaping Use    Vaping status: Never Used   Substance and Sexual Activity    Alcohol use: Yes     Comment: occasionally-as per [place]    Drug use: No    Sexual activity: Yes     Partners: Male     Birth control/protection: None   Other Topics Concern    Not on file   Social History Narrative    · Do you currently or have you served in the [employer]:   No      · Exercise  level:   Heavy      · Diet:   Regular      · General stress level:   Medium      · Caffeine intake:   Moderate      · Guns present in home:   Yes      · Seat belts used routinely:   Yes      · Smoke alarm in home:   Yes      · Advance directive:   No      · Live alone or with others:   with others      · Are there stairs in your home:   Yes      · International travel:   none      · Pets:   No      · History of Physical, Emotional or Sexual Abuse:   No      · Do you feel safe at home:   Yes      ·      Social Determinants of Health     Financial Resource Strain: Not on file   Food Insecurity: Not on file   Transportation Needs: Not on file   Physical Activity: Not on file   Stress: Not on file   Social Connections: Not on file   Intimate Partner Violence: Not on file   Housing Stability: Not on file       45 minutes was spent in the coordination of care, reviewing of imaging and with the patient on the date of service    I, Gerson Deleon PA-C, scribed this note in conjunction with and in the presence of Dr. Camden Garcia DO, who performed parts of the services as described in this documentation      Gerson Deleon PA-C   2/22/2024 10:55 AM     Problem List Items Addressed This Visit    None  Visit Diagnoses       Ollier's disease    -  Primary    Relevant Orders    NM bone scan whole body    Finger fracture, left        Relevant Orders    NM bone scan whole body    Enchondroma, multiple        Relevant Orders    NM bone scan whole body

## 2024-03-07 ENCOUNTER — HOSPITAL ENCOUNTER (OUTPATIENT)
Dept: NUCLEAR MEDICINE | Facility: HOSPITAL | Age: 44
Discharge: HOME | End: 2024-03-07
Payer: COMMERCIAL

## 2024-03-07 DIAGNOSIS — S62.609A FINGER FRACTURE, LEFT: ICD-10-CM

## 2024-03-07 DIAGNOSIS — Q78.4 OLLIER'S DISEASE: ICD-10-CM

## 2024-03-07 DIAGNOSIS — Q78.4: ICD-10-CM

## 2024-03-07 PROCEDURE — 78306 BONE IMAGING WHOLE BODY: CPT

## 2024-03-07 PROCEDURE — A9503 TC99M MEDRONATE: HCPCS

## 2024-03-21 ENCOUNTER — OFFICE VISIT (OUTPATIENT)
Dept: OBGYN CLINIC | Facility: MEDICAL CENTER | Age: 44
End: 2024-03-21
Payer: COMMERCIAL

## 2024-03-21 ENCOUNTER — APPOINTMENT (OUTPATIENT)
Dept: RADIOLOGY | Facility: MEDICAL CENTER | Age: 44
End: 2024-03-21
Payer: COMMERCIAL

## 2024-03-21 VITALS
SYSTOLIC BLOOD PRESSURE: 114 MMHG | DIASTOLIC BLOOD PRESSURE: 76 MMHG | HEART RATE: 72 BPM | WEIGHT: 206 LBS | BODY MASS INDEX: 36.5 KG/M2 | HEIGHT: 63 IN

## 2024-03-21 DIAGNOSIS — Q78.4 OLLIER'S DISEASE: ICD-10-CM

## 2024-03-21 DIAGNOSIS — Q78.4 OLLIER'S DISEASE: Primary | ICD-10-CM

## 2024-03-21 PROCEDURE — 99214 OFFICE O/P EST MOD 30 MIN: CPT | Performed by: STUDENT IN AN ORGANIZED HEALTH CARE EDUCATION/TRAINING PROGRAM

## 2024-03-21 PROCEDURE — 73130 X-RAY EXAM OF HAND: CPT

## 2024-03-21 NOTE — PROGRESS NOTES
Orthopedic Surgery Office Note  Cheyenne Amador (43 y.o. female)  : 1980 Encounter Date: 3/21/2024  Dr. Camden Garcia, DO, Orthopedic Surgeon  [facility]   Phone:[phone] Fax:[phone]    Assessment and Plan: Cheyenne Amador is a 43 y.o. female with:    1.  Ollier's Disease, multiple enchondromatosis   - whole body bone scan for further evaluation was completed and no other lesions were found other than the multiple lesions of the left hand  - discussed increased risk of enchondroma transformation/dedifferentiation and therefore the importance to continue routine monitoring   - follow annually for routine monitoring after next visit in 6 weeks    2. Left index middle phalanx fracture  - dino taping removed at time of visit  - discussed as fracture through lesion  - routine healing of fracture evidenced by bony callus  - continue routine XR in 6 weeks  - advised that she should continue with ROM exercises   -can continue to work full duty    3. Comorbidity, including: severe history of asthma, previously managed with prednisone, no longer routinely   - continue current management     Procedure:  No procedures performed    Surgical Planning:   No surgery planned at this time    Follow up: Return in about 6 weeks (around 2024) for Recheck, Repeat X-ray.   __________________________________________________________________    History of Present Illness:     Cheyenne Amador is a RHD 43 y.o. female with history of prior fracture through fibrous dysplasia per pt (in her left humerus) and hx of severe asthma with long-term prednisone use as a child who presents for consultation at the request of Self, Referral from ED presentation on  regarding left index finger pain following trauma.     She was told in the ED about multiple holes in the bones of her hand, referred to us for further evaluation.     Stabbing pain with motion to the hand. She endorses that finger pain has already  "improved.   She is a hairdresser, and has been able to return to work    At baseline patient gaits without assistance.  No noted constitutional symptoms such as fever, chills, night sweats, fatigue, weight gains/losses. No noted  chest pain/shortness of breath.  Patient has no noted  personal history of cancer.    On presentation today, she states that she is still using the dino tape. She has not tried bending her finger. She has attempted to return to doing hair however she feels limited due to ROM. She states that she is a little sore however she is not experiencing any pain.    Occupation: hairdresser,      Review of Systems:   Allergies, medications, past medical/surgical/family/social history have been reviewed.  Complete 12 system review performed and found to be negative except: except as per mentioned in HPI.    Physical Examination:   Height: 5' 3\" (160 cm)  Weight - Scale: 93.4 kg (206 lb)  BMI (Calculated): 36.5  BSA (Calculated - m2): 1.96 sq meters     Vitals:    03/21/24 1145   BP: 114/76   Pulse: 72     Body mass index is 36.49 kg/m².    General: alert and oriented; well nourished/well developed; no apparent distress.   Present unaccompanied today  Psychiatric: normal mood and affect  HEENT: NCAT. Head/neck - full range of motion.   Lungs: breathing comfortably; equal symmetric chest expansion.   Abdomen: soft, non-tender, non-distended.   Skin: warm; dry; no lesions, rashes, petechiae or purpura; no clubbing, no cyanosis, no edema, - palpable masses.    Extremity: left index finger   Inspection: no edema, skin abnormalities throughout   Palpation: no palpable masses or lesions, TTP   Range of motion of joints: WFL range of motion all extremities.   Motor strength: WNL all extremities.  limited due to splint/pain.    Sensation: grossly intact to all extremities.    Pulses: intact    Lymphatics: (no obvious) lymphadenopathy  Gait: normal gait.    IMAGING RESULTS, All images personally review " today by Dr. Garcia    Study: XR left hand  Date: 3/21/24  Report: No radiologist report available.  Multiple mostly lucent lesions are seen in the hand, most prominently in the metacarpals and phalanges, nonspecific with differential considerations including fibrous dysplasia, multiple enchondromas or eosinophilic granulomas. Superimposed metabolic process such as hyperparathyroidism could have this appearance as could malignant process including metastatic disease/myeloma although these are felt to be less likely.    Interval healing of middle phalanx of left index finger.    Study: XR left hand  Date: 2/18/24  Report: I have read and agree with the radiologist report.  My impression is as follows:   Multiple mostly lucent lesions are seen in the hand, most prominently in the metacarpals and phalanges, nonspecific with differential considerations including fibrous dysplasia, multiple enchondromas or eosinophilic granulomas. Superimposed metabolic   process such as hyperparathyroidism could have this appearance as could malignant process including metastatic disease/myeloma although these are felt to be less likely.     Given the appearance, multiple enchondromas is the favored differential consideration, although clinical follow-up and correlation with the patient's history and laboratory values is recommended nonemergently to exclude other processes.     In addition there is buckling of the cortex as well as an oblique lucency through one of these lucent lesions in the metadiaphysis of the middle phalanx of the index digit; consistent with a fracture without significant displacement.       Patient Care team:   Patient Care Team:  Lottie Bello MD as PCP - General (Internal Medicine)     Past Medical History:   Diagnosis Date    Asthma     BRCA1 negative     BRCA2 negative     Endometriosis     neg    History of mammogram 06/27/2017    History of migraine headaches     Ovarian cyst     Vaginal Pap smear  06/09/2017    neg     Past Surgical History:   Procedure Laterality Date    ECTOPIC PREGNANCY SURGERY      HYSTEROSCOPY  11/23/2010    Hysteroscopy biopsy       Current Outpatient Medications:     acetaminophen (TYLENOL) 500 mg tablet, Take 2 tablets (1,000 mg total) by mouth every 6 (six) hours as needed for moderate pain (Patient not taking: Reported on 3/21/2024), Disp: 40 tablet, Rfl: 0    albuterol (ACCUNEB) 0.63 MG/3ML nebulizer solution, Take 3 mL (0.63 mg total) by nebulization every 6 (six) hours as needed for wheezing (Patient not taking: Reported on 3/21/2024), Disp: 3 mL, Rfl: 0    albuterol (PROVENTIL HFA,VENTOLIN HFA) 90 mcg/act inhaler, Inhale 2 puffs every 6 (six) hours as needed for wheezing (Patient not taking: Reported on 3/21/2024), Disp: , Rfl:     Cholecalciferol (VITAMIN D3 PO), Take 50,000 Units by mouth (Patient not taking: Reported on 3/21/2024), Disp: , Rfl:     guaiFENesin (MUCINEX) 600 mg 12 hr tablet, Take 1 tablet (600 mg total) by mouth every 12 (twelve) hours (Patient not taking: Reported on 2/22/2024), Disp: 30 tablet, Rfl: 0    ibuprofen (MOTRIN) 600 mg tablet, Take 1 tablet (600 mg total) by mouth every 6 (six) hours as needed for mild pain or moderate pain (Patient not taking: Reported on 2/22/2024), Disp: 60 tablet, Rfl: 0    lidocaine (Lidoderm) 5 %, Apply 2 patches topically over 12 hours daily Remove & Discard patch within 12 hours or as directed by MD (Patient not taking: Reported on 3/21/2024), Disp: 30 patch, Rfl: 0    methocarbamol (ROBAXIN) 500 mg tablet, Take 1 tablet (500 mg total) by mouth 3 (three) times a day as needed for muscle spasms (Patient not taking: Reported on 2/22/2024), Disp: 21 tablet, Rfl: 0    naproxen (Naprosyn) 500 mg tablet, Take 1 tablet (500 mg total) by mouth 2 (two) times a day with meals (Patient not taking: Reported on 2/22/2024), Disp: 30 tablet, Rfl: 0  Allergies   Allergen Reactions    Aspirin Shortness Of Breath    Shellfish-Derived  Products - Food Allergy Anaphylaxis     severe  hives  throat closes    Sulfa Antibiotics Hives and Itching     Family History   Problem Relation Age of Onset    Breast cancer Mother 25    Kidney disease Mother     Hypertension Brother     Hyperlipidemia Brother     Breast cancer Paternal Aunt         does not know    Breast cancer Sister 48     Social History     Socioeconomic History    Marital status:      Spouse name: Not on file    Number of children: Not on file    Years of education: 2 year college     Highest education level: Not on file   Occupational History    Occupation:    Tobacco Use    Smoking status: Never    Smokeless tobacco: Never   Vaping Use    Vaping status: Never Used   Substance and Sexual Activity    Alcohol use: Yes     Comment: occasionally-as per [place]    Drug use: No    Sexual activity: Yes     Partners: Male     Birth control/protection: None   Other Topics Concern    Not on file   Social History Narrative    · Do you currently or have you served in the [employer]:   No      · Exercise level:   Heavy      · Diet:   Regular      · General stress level:   Medium      · Caffeine intake:   Moderate      · Guns present in home:   Yes      · Seat belts used routinely:   Yes      · Smoke alarm in home:   Yes      · Advance directive:   No      · Live alone or with others:   with others      · Are there stairs in your home:   Yes      · International travel:   none      · Pets:   No      · History of Physical, Emotional or Sexual Abuse:   No      · Do you feel safe at home:   Yes      ·      Social Determinants of Health     Financial Resource Strain: Not on file   Food Insecurity: Not on file   Transportation Needs: Not on file   Physical Activity: Not on file   Stress: Not on file   Social Connections: Not on file   Intimate Partner Violence: Not on file   Housing Stability: Not on file       30 minutes was spent in the coordination of care, reviewing of imaging and  with the patient on the date of service    Scribe Attestation      I,:  Rae Valdezeson am acting as a scribe while in the presence of the attending physician.:       I,:  Camden Garcia, DO personally performed the services described in this documentation    as scribed in my presence.:               Problem List Items Addressed This Visit    None  Visit Diagnoses       Ollier's disease    -  Primary    Relevant Orders    XR hand 3+ vw left

## 2024-04-24 ENCOUNTER — NURSE TRIAGE (OUTPATIENT)
Age: 44
End: 2024-04-24

## 2024-04-24 DIAGNOSIS — N76.0 BV (BACTERIAL VAGINOSIS): Primary | ICD-10-CM

## 2024-04-24 DIAGNOSIS — B96.89 BV (BACTERIAL VAGINOSIS): Primary | ICD-10-CM

## 2024-04-24 RX ORDER — METRONIDAZOLE 500 MG/1
500 TABLET ORAL EVERY 12 HOURS SCHEDULED
Qty: 14 TABLET | Refills: 0 | Status: CANCELLED | OUTPATIENT
Start: 2024-04-24 | End: 2024-05-01

## 2024-04-24 NOTE — TELEPHONE ENCOUNTER
RN called pt back advising provider would like to see her for a vaginal swab due to symptoms. Pt scheduled for appointment tomorrow morning. No further questions.

## 2024-04-24 NOTE — TELEPHONE ENCOUNTER
"Pt calling with s/s of BV; States has itching with clear vaginal discharge which causes itching. Vaginal discharge has a slight odor as well. Tried 3 days of Monistat which did not help. Also tried Boric Acid which worsened symptoms. Patient has a Yearly on 5/20/24. Asking Flagyl prescription sent to pharmacy on file. RN pended medication and routed to provider for signature. Staff to call patient once order has been signed and/or with additional recommendations.      Reason for Disposition   All other vaginal symptoms (Exception: feels like prior yeast infection, minor abrasion, mild rash < 24 hour duration, mild itching)    Answer Assessment - Initial Assessment Questions  1. SYMPTOM: \"What's the main symptom you're concerned about?\" (e.g., pain, itching, dryness)      Clear discharge, slight itching, slight odor  2. LOCATION: \"Where is the  s/s located?\" (e.g., inside/outside, left/right)      Itches once discharge comes out  3. ONSET: \"When did the  s/s  start?\"      5 days ago  4. PAIN: \"Is there any pain?\" If Yes, ask: \"How bad is it?\" (Scale: 1-10; mild, moderate, severe)      Denies  5. ITCHING: \"Is there any itching?\" If Yes, ask: \"How bad is it?\" (Scale: 1-10; mild, moderate, severe)      7/20  6. CAUSE: \"What do you think is causing the discharge?\" \"Have you had the same problem before? What happened then?\"      BV  7. OTHER SYMPTOMS: \"Do you have any other symptoms?\" (e.g., fever, itching, vaginal bleeding, pain with urination, injury to genital area, vaginal foreign body)      Denies  8. PREGNANCY: \"Is there any chance you are pregnant?\" \"When was your last menstrual period?\"      Denies    Protocols used: Vaginal Symptoms-ADULT-[place]    "

## 2024-04-25 ENCOUNTER — OFFICE VISIT (OUTPATIENT)
Dept: OBGYN CLINIC | Facility: CLINIC | Age: 44
End: 2024-04-25
Payer: COMMERCIAL

## 2024-04-25 VITALS
SYSTOLIC BLOOD PRESSURE: 120 MMHG | WEIGHT: 207.6 LBS | HEIGHT: 63 IN | BODY MASS INDEX: 36.79 KG/M2 | DIASTOLIC BLOOD PRESSURE: 64 MMHG

## 2024-04-25 DIAGNOSIS — Z11.3 SCREEN FOR STD (SEXUALLY TRANSMITTED DISEASE): ICD-10-CM

## 2024-04-25 DIAGNOSIS — N76.0 ACUTE VAGINITIS: Primary | ICD-10-CM

## 2024-04-25 PROCEDURE — 87491 CHLMYD TRACH DNA AMP PROBE: CPT | Performed by: PHYSICIAN ASSISTANT

## 2024-04-25 PROCEDURE — 87510 GARDNER VAG DNA DIR PROBE: CPT | Performed by: PHYSICIAN ASSISTANT

## 2024-04-25 PROCEDURE — 87480 CANDIDA DNA DIR PROBE: CPT | Performed by: PHYSICIAN ASSISTANT

## 2024-04-25 PROCEDURE — 99213 OFFICE O/P EST LOW 20 MIN: CPT | Performed by: PHYSICIAN ASSISTANT

## 2024-04-25 PROCEDURE — 87660 TRICHOMONAS VAGIN DIR PROBE: CPT | Performed by: PHYSICIAN ASSISTANT

## 2024-04-25 PROCEDURE — 87591 N.GONORRHOEAE DNA AMP PROB: CPT | Performed by: PHYSICIAN ASSISTANT

## 2024-04-25 NOTE — PROGRESS NOTES
"Assessment/Plan:    No problem-specific Assessment & Plan notes found for this encounter.       Diagnoses and all orders for this visit:    Acute vaginitis  -     VAGINOSIS DNA PROBE    Screen for STD (sexually transmitted disease)  -     Chlamydia/GC amplified DNA by PCR        GC/chlamydia screening and vaginal cultures done.  We will call with results and treat as necessary.  Do not use boric acid again!  F/u in May for yearly gyn exam as planned.  Call if symptoms worsen or change.    Subjective:      Patient ID: Cheyenne Amador is a 43 y.o. female.    Patient is here with complaint of possible vaginal infection.  States symptoms started about 10 days ago.  Experiencing vaginal discharge, itching, and mild odor.  Has a history of BV infection.  Tried 3 days of OTC Monistat, but it did not help.  Also used a boric acid suppository which caused a severe allergic reaction; this has resolved.  Denies urinary symptoms, pelvic pain, abdominal pain, n/v, and fever/chills.  She is sexually active with a monogamous partner; requests STD screening.        The following portions of the patient's history were reviewed and updated as appropriate: allergies, current medications, past family history, past medical history, past social history, past surgical history, and problem list.    Review of Systems   Constitutional:  Negative for chills and fever.   Gastrointestinal:  Negative for abdominal distention, abdominal pain, nausea and vomiting.   Genitourinary:  Positive for vaginal discharge (Vaginal odor). Negative for difficulty urinating, dysuria, frequency, genital sores, hematuria, menstrual problem, pelvic pain, urgency, vaginal bleeding and vaginal pain.        Vaginal itching         Objective:      /64 (BP Location: Left arm, Patient Position: Sitting, Cuff Size: Adult)   Ht 5' 3\" (1.6 m)   Wt 94.2 kg (207 lb 9.6 oz)   LMP 04/09/2024 (Approximate)   BMI 36.77 kg/m²          Physical Exam  Vitals reviewed. Exam " conducted with a chaperone present.   Constitutional:       Appearance: Normal appearance. She is well-developed. She is obese.   Genitourinary:     General: Normal vulva.      Pubic Area: No rash.       Labia:         Right: No rash, tenderness, lesion or injury.         Left: No rash, tenderness, lesion or injury.       Vagina: Normal. No vaginal discharge, erythema, tenderness or bleeding.      Cervix: Normal.      Uterus: Normal.       Adnexa: Right adnexa normal and left adnexa normal.        Right: No mass, tenderness or fullness.          Left: No mass, tenderness or fullness.     Lymphadenopathy:      Lower Body: No right inguinal adenopathy. No left inguinal adenopathy.   Skin:     General: Skin is warm and dry.   Neurological:      Mental Status: She is alert and oriented to person, place, and time.   Psychiatric:         Mood and Affect: Mood normal.         Behavior: Behavior normal. Behavior is cooperative.         Thought Content: Thought content normal.         Judgment: Judgment normal.

## 2024-04-26 ENCOUNTER — TELEPHONE (OUTPATIENT)
Dept: OBGYN CLINIC | Facility: CLINIC | Age: 44
End: 2024-04-26

## 2024-04-26 DIAGNOSIS — N76.0 BV (BACTERIAL VAGINOSIS): Primary | ICD-10-CM

## 2024-04-26 DIAGNOSIS — B96.89 BV (BACTERIAL VAGINOSIS): Primary | ICD-10-CM

## 2024-04-26 LAB
C TRACH DNA SPEC QL NAA+PROBE: NEGATIVE
CANDIDA RRNA VAG QL PROBE: NOT DETECTED
G VAGINALIS RRNA GENITAL QL PROBE: DETECTED
N GONORRHOEA DNA SPEC QL NAA+PROBE: NEGATIVE
T VAGINALIS RRNA GENITAL QL PROBE: NOT DETECTED

## 2024-04-26 RX ORDER — METRONIDAZOLE 500 MG/1
500 TABLET ORAL EVERY 12 HOURS SCHEDULED
Qty: 14 TABLET | Refills: 0 | Status: SHIPPED | OUTPATIENT
Start: 2024-04-26 | End: 2024-05-03

## 2024-04-26 NOTE — TELEPHONE ENCOUNTER
Spoke with patient regarding results.  GC/chlamydia screening negative.  Vaginal culture positive for BV.  Rx for metronidazole 500 mg BID x 7 days sent to pharmacy.  Instructed patient to avoid alcohol while on abx.  Call if symptoms do not resolve.

## 2024-09-16 ENCOUNTER — APPOINTMENT (EMERGENCY)
Dept: RADIOLOGY | Facility: HOSPITAL | Age: 44
End: 2024-09-16

## 2024-09-16 ENCOUNTER — HOSPITAL ENCOUNTER (EMERGENCY)
Facility: HOSPITAL | Age: 44
Discharge: HOME | End: 2024-09-16
Attending: EMERGENCY MEDICINE

## 2024-09-16 VITALS
OXYGEN SATURATION: 99 % | RESPIRATION RATE: 18 BRPM | DIASTOLIC BLOOD PRESSURE: 64 MMHG | HEIGHT: 63 IN | HEART RATE: 67 BPM | SYSTOLIC BLOOD PRESSURE: 121 MMHG | WEIGHT: 205.25 LBS | TEMPERATURE: 98.3 F | BODY MASS INDEX: 36.37 KG/M2

## 2024-09-16 DIAGNOSIS — S60.042A CONTUSION OF LEFT RING FINGER WITHOUT DAMAGE TO NAIL, INITIAL ENCOUNTER: Primary | ICD-10-CM

## 2024-09-16 PROCEDURE — 73140 X-RAY EXAM OF FINGER(S): CPT

## 2024-09-16 PROCEDURE — 99283 EMERGENCY DEPT VISIT LOW MDM: CPT

## 2024-09-16 PROCEDURE — 99284 EMERGENCY DEPT VISIT MOD MDM: CPT | Performed by: EMERGENCY MEDICINE

## 2024-09-16 NOTE — ED PROVIDER NOTES
1. Contusion of left ring finger without damage to nail, initial encounter      ED Disposition       ED Disposition   Discharge    Condition   Stable    Date/Time   Mon Sep 16, 2024  1:01 PM    Comment   Cheyenne Amador discharge to home/self care.                   Assessment & Plan       Medical Decision Making  43-year-old female presenting for evaluation of left fourth finger pain after injury.  The extremity is neurovascularly intact.  Differential diagnoses include but not limited to fracture, dislocation, sprain/strain, contusion.  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  Likely contusion.  Patient placed in static finger splint for comfort.  Advised follow-up with orthopedics.  Return precautions discussed.    Problems Addressed:  Contusion of left ring finger without damage to nail, initial encounter: acute illness or injury    Amount and/or Complexity of Data Reviewed  Radiology: ordered and independent interpretation performed.                       Medications - No data to display    History of Present Illness       43-year-old female with no pertinent past medical history presenting for evaluation of a left fourth digit injury.  Patient reports striking the MCP of her left fourth digit yesterday on a chair.  She had pain immediately in the area which has progressively worsened.  She noted swelling today which prompted her to seek evaluation.  No numbness or weakness.  No other injuries.  She has not taken any medication for it.            Review of Systems   Constitutional:  Negative for fever.   Respiratory:  Negative for shortness of breath.    Cardiovascular:  Negative for chest pain.   Gastrointestinal:  Negative for abdominal pain.   Genitourinary:  Negative for flank pain.   Musculoskeletal:  Positive for arthralgias.   Skin:  Negative for wound.   Neurological:  Negative for weakness and numbness.   All other systems reviewed and are  negative.          Objective     ED Triage Vitals [09/16/24 1221]   Temperature Pulse Blood Pressure Respirations SpO2 Patient Position - Orthostatic VS   98.3 °F (36.8 °C) 67 121/64 18 99 % Lying      Temp Source Heart Rate Source BP Location FiO2 (%) Pain Score    Oral Monitor Right arm -- 6        Physical Exam  Vitals reviewed.   Constitutional:       General: She is not in acute distress.     Appearance: She is not ill-appearing.   HENT:      Head: Normocephalic and atraumatic.      Nose: Nose normal.      Mouth/Throat:      Mouth: Mucous membranes are moist.   Eyes:      Conjunctiva/sclera: Conjunctivae normal.   Cardiovascular:      Rate and Rhythm: Normal rate.      Comments: 2+ left radial pulse.  Pulmonary:      Effort: Pulmonary effort is normal.   Abdominal:      General: There is no distension.   Musculoskeletal:         General: Normal range of motion.      Cervical back: Normal range of motion and neck supple.      Comments: Swelling noted over the left fourth digit MCP.  Tenderness in this area as well.   Skin:     General: Skin is warm and dry.      Findings: No bruising or rash.   Neurological:      General: No focal deficit present.      Mental Status: She is alert.      Sensory: No sensory deficit.      Motor: No weakness.         Labs Reviewed - No data to display  XR finger fourth digit-ring LEFT   ED Interpretation by Sruthi Interiano MD (09/16 1246)   No acute fracture or dislocation. Multiple lucencies noted throughout, patient with reported history of Ollier's disease. Independently interpreted by me.          Splint application    Date/Time: 9/16/2024 1:15 PM    Performed by: Sruthi Interiano MD  Authorized by: Sruthi Interiano MD  Universal Protocol:  procedure performed by consultantConsent: Verbal consent obtained.  Risks and benefits: risks, benefits and alternatives were discussed  Consent given by: patient  Patient understanding: patient states understanding of the procedure being  performed  Required items: required blood products, implants, devices, and special equipment available  Patient identity confirmed: verbally with patient    Pre-procedure details:     Sensation:  Normal    Skin color:  Pink  Procedure details:     Laterality:  Left    Location:  Hand    Hand:  L hand    Splint type:  Finger splint, static  Post-procedure details:     Pain:  Improved    Sensation:  Normal    Skin color:  Pink    Patient tolerance of procedure:  Tolerated well, no immediate complications         Sruthi Interiano MD  09/16/24 6213

## 2024-09-16 NOTE — DISCHARGE INSTRUCTIONS
Follow-up with your primary care physician and orthopedics as needed.  You can apply ice to the area.  You can take Tylenol and Motrin every 6 hours as needed for pain.  Please return to the emergency department if you develop worsening symptoms, severe pain, or anything else concerning to you.

## 2024-10-28 ENCOUNTER — NURSE TRIAGE (OUTPATIENT)
Age: 44
End: 2024-10-28

## 2024-10-28 DIAGNOSIS — B96.89 BV (BACTERIAL VAGINOSIS): Primary | ICD-10-CM

## 2024-10-28 DIAGNOSIS — N76.0 BV (BACTERIAL VAGINOSIS): Primary | ICD-10-CM

## 2024-10-28 RX ORDER — METRONIDAZOLE 7.5 MG/G
1 GEL VAGINAL
Qty: 5 G | Refills: 0 | Status: SHIPPED | OUTPATIENT
Start: 2024-10-28 | End: 2024-11-02

## 2024-10-28 NOTE — TELEPHONE ENCOUNTER
"Spoke with patient who reports she started with BV symptoms 3 days ago.  She reports light color discharge, thick but not like yeast.  She also report odor and cramping.  She was advised metrogel will be sent to pharmacy.  If symptoms persist, she will need to schedule an appointment.  Pt verbalized understanding, no further questions or concerns at this time.  Allergies and pharmacy reviewed with pt.  Pt did initially request flagyl, but is going on vacation and was advised she can not drink alcohol while taking.  She then agreed to metrogel.      If patient has history of BV in prior two years, prescribe:    -Metrogel Intravaginally x 5 nights, with no refills.    Medication instructions:  Please instruct patient that they cannot drink alcohol while on Flagyl.    Please instruct patient that they should not have sex while on treatment or 3 days after if using Metrogel.    If patient is having recurrent BV infections, please schedule appointment (should be seen within 5 days).      Reason for Disposition   Bad smelling vaginal discharge    Answer Assessment - Initial Assessment Questions  1. DISCHARGE: \"Describe the discharge.\" (e.g., white, yellow, green, gray, foamy, cottage cheese-like)      Light color, thickish but not like yeast.  2. ODOR: \"Is there a bad odor?\"      yes  3. ONSET: \"When did the discharge begin?\"      3 days ago  4. RASH: \"Is there a rash in the genital area?\" If Yes, ask: \"Describe it.\" (e.g., redness, blisters, sores, bumps)      denies  5. ABDOMEN PAIN: \"Are you having any abdomen pain?\" If Yes, ask: \"What does it feel like? \" (e.g., crampy, dull, intermittent, constant)       cramping  6. ABDOMEN PAIN SEVERITY: If present, ask: \"How bad is it?\" (e.g., Scale 1-10; mild, moderate, or severe)      4/10  7. CAUSE: \"What do you think is causing the discharge?\" \"Have you had the same problem before?\" \"What happened then?\"      BV  8. OTHER SYMPTOMS: \"Do you have any other symptoms?\" (e.g., " "fever, itching, vaginal bleeding, pain with urination, injury to genital area, vaginal foreign body)      itching  9. PREGNANCY: \"Is there any chance you are pregnant?\" \"When was your last menstrual period?\"      10/9    Protocols used: Vaginal Discharge-Adult-[place]    "